# Patient Record
Sex: MALE | Race: WHITE | NOT HISPANIC OR LATINO | Employment: FULL TIME | URBAN - METROPOLITAN AREA
[De-identification: names, ages, dates, MRNs, and addresses within clinical notes are randomized per-mention and may not be internally consistent; named-entity substitution may affect disease eponyms.]

---

## 2024-09-02 ENCOUNTER — HOSPITAL ENCOUNTER (OUTPATIENT)
Facility: HOSPITAL | Age: 29
Setting detail: OBSERVATION
Discharge: HOME | End: 2024-09-06
Attending: EMERGENCY MEDICINE | Admitting: HOSPITALIST
Payer: MEDICAID

## 2024-09-02 ENCOUNTER — APPOINTMENT (OUTPATIENT)
Dept: CT IMAGING | Facility: HOSPITAL | Age: 29
End: 2024-09-02
Payer: MEDICAID

## 2024-09-02 ENCOUNTER — APPOINTMENT (OUTPATIENT)
Dept: GENERAL RADIOLOGY | Facility: HOSPITAL | Age: 29
End: 2024-09-02
Payer: MEDICAID

## 2024-09-02 DIAGNOSIS — R10.13 EPIGASTRIC PAIN: ICD-10-CM

## 2024-09-02 DIAGNOSIS — I10 HYPERTENSION, UNSPECIFIED TYPE: ICD-10-CM

## 2024-09-02 DIAGNOSIS — D72.829 LEUKOCYTOSIS, UNSPECIFIED TYPE: ICD-10-CM

## 2024-09-02 DIAGNOSIS — R11.2 NAUSEA AND VOMITING, UNSPECIFIED VOMITING TYPE: ICD-10-CM

## 2024-09-02 DIAGNOSIS — R07.9 CHEST PAIN, UNSPECIFIED TYPE: ICD-10-CM

## 2024-09-02 DIAGNOSIS — R93.5 ABNORMAL COMPUTED TOMOGRAPHY ANGIOGRAPHY (CTA) OF ABDOMEN AND PELVIS: Primary | ICD-10-CM

## 2024-09-02 LAB
ALBUMIN SERPL-MCNC: 4.1 G/DL (ref 3.5–5.2)
ALBUMIN/GLOB SERPL: 1 G/DL
ALP SERPL-CCNC: 121 U/L (ref 39–117)
ALT SERPL W P-5'-P-CCNC: 14 U/L (ref 1–41)
ANION GAP SERPL CALCULATED.3IONS-SCNC: 14 MMOL/L (ref 5–15)
AST SERPL-CCNC: 14 U/L (ref 1–40)
BASOPHILS # BLD AUTO: 0.03 10*3/MM3 (ref 0–0.2)
BASOPHILS NFR BLD AUTO: 0.2 % (ref 0–1.5)
BILIRUB SERPL-MCNC: 0.3 MG/DL (ref 0–1.2)
BUN SERPL-MCNC: 18 MG/DL (ref 6–20)
BUN/CREAT SERPL: 20.2 (ref 7–25)
CALCIUM SPEC-SCNC: 9.5 MG/DL (ref 8.6–10.5)
CHLORIDE SERPL-SCNC: 97 MMOL/L (ref 98–107)
CO2 SERPL-SCNC: 24 MMOL/L (ref 22–29)
CREAT SERPL-MCNC: 0.89 MG/DL (ref 0.76–1.27)
DEPRECATED RDW RBC AUTO: 37.5 FL (ref 37–54)
EGFRCR SERPLBLD CKD-EPI 2021: 120.5 ML/MIN/1.73
EOSINOPHIL # BLD AUTO: 0 10*3/MM3 (ref 0–0.4)
EOSINOPHIL NFR BLD AUTO: 0 % (ref 0.3–6.2)
ERYTHROCYTE [DISTWIDTH] IN BLOOD BY AUTOMATED COUNT: 12.8 % (ref 12.3–15.4)
GLOBULIN UR ELPH-MCNC: 4.2 GM/DL
GLUCOSE SERPL-MCNC: 86 MG/DL (ref 65–99)
HCT VFR BLD AUTO: 41.8 % (ref 37.5–51)
HGB BLD-MCNC: 13.9 G/DL (ref 13–17.7)
HOLD SPECIMEN: NORMAL
HOLD SPECIMEN: NORMAL
IMM GRANULOCYTES # BLD AUTO: 0.05 10*3/MM3 (ref 0–0.05)
IMM GRANULOCYTES NFR BLD AUTO: 0.4 % (ref 0–0.5)
LIPASE SERPL-CCNC: 10 U/L (ref 13–60)
LYMPHOCYTES # BLD AUTO: 1.79 10*3/MM3 (ref 0.7–3.1)
LYMPHOCYTES NFR BLD AUTO: 12.7 % (ref 19.6–45.3)
MCH RBC QN AUTO: 27.1 PG (ref 26.6–33)
MCHC RBC AUTO-ENTMCNC: 33.3 G/DL (ref 31.5–35.7)
MCV RBC AUTO: 81.5 FL (ref 79–97)
MONOCYTES # BLD AUTO: 0.96 10*3/MM3 (ref 0.1–0.9)
MONOCYTES NFR BLD AUTO: 6.8 % (ref 5–12)
NEUTROPHILS NFR BLD AUTO: 11.22 10*3/MM3 (ref 1.7–7)
NEUTROPHILS NFR BLD AUTO: 79.9 % (ref 42.7–76)
NRBC BLD AUTO-RTO: 0 /100 WBC (ref 0–0.2)
PLATELET # BLD AUTO: 227 10*3/MM3 (ref 140–450)
PMV BLD AUTO: 11.3 FL (ref 6–12)
POTASSIUM SERPL-SCNC: 4 MMOL/L (ref 3.5–5.2)
PROT SERPL-MCNC: 8.3 G/DL (ref 6–8.5)
RBC # BLD AUTO: 5.13 10*6/MM3 (ref 4.14–5.8)
SODIUM SERPL-SCNC: 135 MMOL/L (ref 136–145)
TROPONIN T SERPL HS-MCNC: <6 NG/L
WBC NRBC COR # BLD AUTO: 14.05 10*3/MM3 (ref 3.4–10.8)
WHOLE BLOOD HOLD COAG: NORMAL
WHOLE BLOOD HOLD SPECIMEN: NORMAL

## 2024-09-02 PROCEDURE — 93005 ELECTROCARDIOGRAM TRACING: CPT | Performed by: EMERGENCY MEDICINE

## 2024-09-02 PROCEDURE — 25810000003 SODIUM CHLORIDE 0.9 % SOLUTION: Performed by: EMERGENCY MEDICINE

## 2024-09-02 PROCEDURE — 71275 CT ANGIOGRAPHY CHEST: CPT

## 2024-09-02 PROCEDURE — 25010000002 ONDANSETRON PER 1 MG: Performed by: EMERGENCY MEDICINE

## 2024-09-02 PROCEDURE — 80053 COMPREHEN METABOLIC PANEL: CPT | Performed by: EMERGENCY MEDICINE

## 2024-09-02 PROCEDURE — 84484 ASSAY OF TROPONIN QUANT: CPT | Performed by: EMERGENCY MEDICINE

## 2024-09-02 PROCEDURE — 85025 COMPLETE CBC W/AUTO DIFF WBC: CPT | Performed by: EMERGENCY MEDICINE

## 2024-09-02 PROCEDURE — 83690 ASSAY OF LIPASE: CPT | Performed by: EMERGENCY MEDICINE

## 2024-09-02 PROCEDURE — 71045 X-RAY EXAM CHEST 1 VIEW: CPT

## 2024-09-02 PROCEDURE — 96361 HYDRATE IV INFUSION ADD-ON: CPT

## 2024-09-02 PROCEDURE — 96374 THER/PROPH/DIAG INJ IV PUSH: CPT

## 2024-09-02 PROCEDURE — 25510000001 IOPAMIDOL PER 1 ML: Performed by: EMERGENCY MEDICINE

## 2024-09-02 PROCEDURE — 74177 CT ABD & PELVIS W/CONTRAST: CPT

## 2024-09-02 PROCEDURE — 99285 EMERGENCY DEPT VISIT HI MDM: CPT

## 2024-09-02 PROCEDURE — 93010 ELECTROCARDIOGRAM REPORT: CPT | Performed by: INTERNAL MEDICINE

## 2024-09-02 PROCEDURE — 93005 ELECTROCARDIOGRAM TRACING: CPT

## 2024-09-02 PROCEDURE — G0378 HOSPITAL OBSERVATION PER HR: HCPCS

## 2024-09-02 RX ORDER — SODIUM CHLORIDE 0.9 % (FLUSH) 0.9 %
10 SYRINGE (ML) INJECTION AS NEEDED
Status: DISCONTINUED | OUTPATIENT
Start: 2024-09-02 | End: 2024-09-06 | Stop reason: HOSPADM

## 2024-09-02 RX ORDER — IOPAMIDOL 755 MG/ML
100 INJECTION, SOLUTION INTRAVASCULAR
Status: COMPLETED | OUTPATIENT
Start: 2024-09-02 | End: 2024-09-02

## 2024-09-02 RX ORDER — MORPHINE SULFATE 2 MG/ML
2 INJECTION, SOLUTION INTRAMUSCULAR; INTRAVENOUS ONCE AS NEEDED
Status: COMPLETED | OUTPATIENT
Start: 2024-09-02 | End: 2024-09-03

## 2024-09-02 RX ORDER — ONDANSETRON 2 MG/ML
8 INJECTION INTRAMUSCULAR; INTRAVENOUS ONCE
Status: COMPLETED | OUTPATIENT
Start: 2024-09-02 | End: 2024-09-02

## 2024-09-02 RX ADMIN — SODIUM CHLORIDE 1000 ML: 9 INJECTION, SOLUTION INTRAVENOUS at 23:09

## 2024-09-02 RX ADMIN — IOPAMIDOL 95 ML: 755 INJECTION, SOLUTION INTRAVENOUS at 22:44

## 2024-09-02 RX ADMIN — ONDANSETRON 8 MG: 2 INJECTION, SOLUTION INTRAMUSCULAR; INTRAVENOUS at 23:09

## 2024-09-03 ENCOUNTER — APPOINTMENT (OUTPATIENT)
Dept: CARDIOLOGY | Facility: HOSPITAL | Age: 29
End: 2024-09-03
Payer: MEDICAID

## 2024-09-03 ENCOUNTER — APPOINTMENT (OUTPATIENT)
Dept: CT IMAGING | Facility: HOSPITAL | Age: 29
End: 2024-09-03
Payer: MEDICAID

## 2024-09-03 PROBLEM — R11.2 NAUSEA & VOMITING: Status: ACTIVE | Noted: 2024-09-03

## 2024-09-03 PROBLEM — R06.00 DYSPNEA: Status: ACTIVE | Noted: 2024-09-03

## 2024-09-03 PROBLEM — R07.9 CHEST PAIN: Status: ACTIVE | Noted: 2024-09-03

## 2024-09-03 PROBLEM — D64.9 ANEMIA: Status: ACTIVE | Noted: 2024-09-03

## 2024-09-03 PROBLEM — R93.5 ABNORMAL COMPUTED TOMOGRAPHY ANGIOGRAPHY (CTA) OF ABDOMEN AND PELVIS: Status: ACTIVE | Noted: 2024-09-03

## 2024-09-03 PROBLEM — R94.31 ABNORMAL EKG: Status: ACTIVE | Noted: 2024-09-03

## 2024-09-03 PROBLEM — D72.829 LEUKOCYTOSIS: Status: ACTIVE | Noted: 2024-09-03

## 2024-09-03 LAB
ANION GAP SERPL CALCULATED.3IONS-SCNC: 11 MMOL/L (ref 5–15)
AORTIC ARCH: 1.8 CM
AORTIC DIMENSIONLESS INDEX: 0.9 (DI)
ASCENDING AORTA: 2.4 CM
BH CV ECHO MEAS - ACS: 2.29 CM
BH CV ECHO MEAS - AO MAX PG: 4.6 MMHG
BH CV ECHO MEAS - AO MEAN PG: 3 MMHG
BH CV ECHO MEAS - AO ROOT DIAM: 2.9 CM
BH CV ECHO MEAS - AO V2 MAX: 107 CM/SEC
BH CV ECHO MEAS - AO V2 VTI: 20.5 CM
BH CV ECHO MEAS - AVA(I,D): 2.9 CM2
BH CV ECHO MEAS - EDV(CUBED): 81.4 ML
BH CV ECHO MEAS - EDV(MOD-SP2): 92 ML
BH CV ECHO MEAS - EDV(MOD-SP4): 97 ML
BH CV ECHO MEAS - EF(MOD-BP): 65.4 %
BH CV ECHO MEAS - EF(MOD-SP2): 65.2 %
BH CV ECHO MEAS - EF(MOD-SP4): 66 %
BH CV ECHO MEAS - ESV(CUBED): 24 ML
BH CV ECHO MEAS - ESV(MOD-SP2): 32 ML
BH CV ECHO MEAS - ESV(MOD-SP4): 33 ML
BH CV ECHO MEAS - FS: 33.5 %
BH CV ECHO MEAS - IVS/LVPW: 1.15 CM
BH CV ECHO MEAS - IVSD: 0.82 CM
BH CV ECHO MEAS - LAT PEAK E' VEL: 15.1 CM/SEC
BH CV ECHO MEAS - LV MASS(C)D: 100.6 GRAMS
BH CV ECHO MEAS - LV MAX PG: 3.5 MMHG
BH CV ECHO MEAS - LV MEAN PG: 2 MMHG
BH CV ECHO MEAS - LV V1 MAX: 93.1 CM/SEC
BH CV ECHO MEAS - LV V1 VTI: 17.9 CM
BH CV ECHO MEAS - LVIDD: 4.3 CM
BH CV ECHO MEAS - LVIDS: 2.9 CM
BH CV ECHO MEAS - LVOT AREA: 3.3 CM2
BH CV ECHO MEAS - LVOT DIAM: 2.06 CM
BH CV ECHO MEAS - LVPWD: 0.71 CM
BH CV ECHO MEAS - MED PEAK E' VEL: 12.5 CM/SEC
BH CV ECHO MEAS - MV A DUR: 0.1 SEC
BH CV ECHO MEAS - MV A MAX VEL: 46.1 CM/SEC
BH CV ECHO MEAS - MV DEC SLOPE: 427.6 CM/SEC2
BH CV ECHO MEAS - MV DEC TIME: 0.22 SEC
BH CV ECHO MEAS - MV E MAX VEL: 77.6 CM/SEC
BH CV ECHO MEAS - MV E/A: 1.68
BH CV ECHO MEAS - MV MAX PG: 3.5 MMHG
BH CV ECHO MEAS - MV MEAN PG: 1.1 MMHG
BH CV ECHO MEAS - MV P1/2T: 63.2 MSEC
BH CV ECHO MEAS - MV V2 VTI: 29.8 CM
BH CV ECHO MEAS - MVA(P1/2T): 3.5 CM2
BH CV ECHO MEAS - MVA(VTI): 2 CM2
BH CV ECHO MEAS - PA ACC TIME: 0.12 SEC
BH CV ECHO MEAS - PA V2 MAX: 122.2 CM/SEC
BH CV ECHO MEAS - PULM A REVS DUR: 0.11 SEC
BH CV ECHO MEAS - PULM A REVS VEL: 17.9 CM/SEC
BH CV ECHO MEAS - PULM DIAS VEL: 62.1 CM/SEC
BH CV ECHO MEAS - PULM S/D: 0.52
BH CV ECHO MEAS - PULM SYS VEL: 32 CM/SEC
BH CV ECHO MEAS - RAP SYSTOLE: 3 MMHG
BH CV ECHO MEAS - RV MAX PG: 5.7 MMHG
BH CV ECHO MEAS - RV V1 MAX: 119.8 CM/SEC
BH CV ECHO MEAS - RV V1 VTI: 21.8 CM
BH CV ECHO MEAS - RVSP: 12 MMHG
BH CV ECHO MEAS - SUP REN AO DIAM: 1.5 CM
BH CV ECHO MEAS - SV(LVOT): 59.6 ML
BH CV ECHO MEAS - SV(MOD-SP2): 60 ML
BH CV ECHO MEAS - SV(MOD-SP4): 64 ML
BH CV ECHO MEAS - TAPSE (>1.6): 2.7 CM
BH CV ECHO MEAS - TR MAX PG: 8.6 MMHG
BH CV ECHO MEAS - TR MAX VEL: 146.6 CM/SEC
BH CV ECHO MEASUREMENTS AVERAGE E/E' RATIO: 5.62
BH CV XLRA - RV BASE: 3.1 CM
BH CV XLRA - RV LENGTH: 8.2 CM
BH CV XLRA - RV MID: 3 CM
BH CV XLRA - TDI S': 18.3 CM/SEC
BILIRUB UR QL STRIP: NEGATIVE
BUN SERPL-MCNC: 16 MG/DL (ref 6–20)
BUN/CREAT SERPL: 21.1 (ref 7–25)
CALCIUM SPEC-SCNC: 8.8 MG/DL (ref 8.6–10.5)
CHLORIDE SERPL-SCNC: 101 MMOL/L (ref 98–107)
CLARITY UR: CLEAR
CO2 SERPL-SCNC: 23 MMOL/L (ref 22–29)
COLOR UR: YELLOW
CREAT SERPL-MCNC: 0.76 MG/DL (ref 0.76–1.27)
DEPRECATED RDW RBC AUTO: 34.7 FL (ref 37–54)
EGFRCR SERPLBLD CKD-EPI 2021: 126.3 ML/MIN/1.73
ERYTHROCYTE [DISTWIDTH] IN BLOOD BY AUTOMATED COUNT: 12.3 % (ref 12.3–15.4)
GEN 5 2HR TROPONIN T REFLEX: 6 NG/L
GLUCOSE SERPL-MCNC: 79 MG/DL (ref 65–99)
GLUCOSE UR STRIP-MCNC: NEGATIVE MG/DL
HCT VFR BLD AUTO: 36.7 % (ref 37.5–51)
HGB BLD-MCNC: 12 G/DL (ref 13–17.7)
HGB UR QL STRIP.AUTO: NEGATIVE
KETONES UR QL STRIP: ABNORMAL
LEFT ATRIUM VOLUME INDEX: 14.1 ML/M2
LEUKOCYTE ESTERASE UR QL STRIP.AUTO: NEGATIVE
MCH RBC QN AUTO: 26.3 PG (ref 26.6–33)
MCHC RBC AUTO-ENTMCNC: 32.7 G/DL (ref 31.5–35.7)
MCV RBC AUTO: 80.3 FL (ref 79–97)
NITRITE UR QL STRIP: NEGATIVE
PH UR STRIP.AUTO: 6 [PH] (ref 5–8)
PLATELET # BLD AUTO: 245 10*3/MM3 (ref 140–450)
PMV BLD AUTO: 11.5 FL (ref 6–12)
POTASSIUM SERPL-SCNC: 4.3 MMOL/L (ref 3.5–5.2)
PROT UR QL STRIP: NEGATIVE
QT INTERVAL: 385 MS
QTC INTERVAL: 442 MS
RBC # BLD AUTO: 4.57 10*6/MM3 (ref 4.14–5.8)
SINUS: 2.35 CM
SODIUM SERPL-SCNC: 135 MMOL/L (ref 136–145)
SP GR UR STRIP: >1.03 (ref 1–1.03)
STJ: 2.07 CM
TROPONIN T DELTA: NORMAL
TROPONIN T SERPL HS-MCNC: 7 NG/L
UROBILINOGEN UR QL STRIP: ABNORMAL
WBC NRBC COR # BLD AUTO: 13.39 10*3/MM3 (ref 3.4–10.8)

## 2024-09-03 PROCEDURE — 36415 COLL VENOUS BLD VENIPUNCTURE: CPT | Performed by: NURSE PRACTITIONER

## 2024-09-03 PROCEDURE — G0378 HOSPITAL OBSERVATION PER HR: HCPCS

## 2024-09-03 PROCEDURE — 84484 ASSAY OF TROPONIN QUANT: CPT | Performed by: NURSE PRACTITIONER

## 2024-09-03 PROCEDURE — 93306 TTE W/DOPPLER COMPLETE: CPT

## 2024-09-03 PROCEDURE — 99204 OFFICE O/P NEW MOD 45 MIN: CPT | Performed by: SURGERY

## 2024-09-03 PROCEDURE — 81003 URINALYSIS AUTO W/O SCOPE: CPT | Performed by: EMERGENCY MEDICINE

## 2024-09-03 PROCEDURE — 99254 IP/OBS CNSLTJ NEW/EST MOD 60: CPT | Performed by: INTERNAL MEDICINE

## 2024-09-03 PROCEDURE — 96361 HYDRATE IV INFUSION ADD-ON: CPT

## 2024-09-03 PROCEDURE — 25510000001 PERFLUTREN 6.52 MG/ML SUSPENSION 2 ML VIAL: Performed by: INTERNAL MEDICINE

## 2024-09-03 PROCEDURE — 25010000002 MORPHINE PER 10 MG: Performed by: EMERGENCY MEDICINE

## 2024-09-03 PROCEDURE — 25810000003 SODIUM CHLORIDE 0.9 % SOLUTION: Performed by: NURSE PRACTITIONER

## 2024-09-03 PROCEDURE — 85027 COMPLETE CBC AUTOMATED: CPT | Performed by: NURSE PRACTITIONER

## 2024-09-03 PROCEDURE — 74175 CTA ABDOMEN W/CONTRAST: CPT

## 2024-09-03 PROCEDURE — 25010000002 MORPHINE PER 10 MG: Performed by: NURSE PRACTITIONER

## 2024-09-03 PROCEDURE — 96375 TX/PRO/DX INJ NEW DRUG ADDON: CPT

## 2024-09-03 PROCEDURE — 80048 BASIC METABOLIC PNL TOTAL CA: CPT | Performed by: NURSE PRACTITIONER

## 2024-09-03 PROCEDURE — 25510000001 IOPAMIDOL PER 1 ML: Performed by: HOSPITALIST

## 2024-09-03 PROCEDURE — 93306 TTE W/DOPPLER COMPLETE: CPT | Performed by: INTERNAL MEDICINE

## 2024-09-03 PROCEDURE — 96376 TX/PRO/DX INJ SAME DRUG ADON: CPT

## 2024-09-03 PROCEDURE — 84484 ASSAY OF TROPONIN QUANT: CPT | Performed by: EMERGENCY MEDICINE

## 2024-09-03 PROCEDURE — 36415 COLL VENOUS BLD VENIPUNCTURE: CPT

## 2024-09-03 RX ORDER — AMOXICILLIN 250 MG
2 CAPSULE ORAL 2 TIMES DAILY PRN
Status: DISCONTINUED | OUTPATIENT
Start: 2024-09-03 | End: 2024-09-06 | Stop reason: HOSPADM

## 2024-09-03 RX ORDER — CALCIUM CARBONATE 500 MG/1
2 TABLET, CHEWABLE ORAL 2 TIMES DAILY PRN
Status: DISCONTINUED | OUTPATIENT
Start: 2024-09-03 | End: 2024-09-06 | Stop reason: HOSPADM

## 2024-09-03 RX ORDER — POLYETHYLENE GLYCOL 3350 17 G/17G
17 POWDER, FOR SOLUTION ORAL DAILY PRN
Status: DISCONTINUED | OUTPATIENT
Start: 2024-09-03 | End: 2024-09-06 | Stop reason: HOSPADM

## 2024-09-03 RX ORDER — ACETAMINOPHEN 160 MG/5ML
650 SOLUTION ORAL EVERY 4 HOURS PRN
Status: DISCONTINUED | OUTPATIENT
Start: 2024-09-03 | End: 2024-09-06 | Stop reason: HOSPADM

## 2024-09-03 RX ORDER — SUCRALFATE 1 G/1
1 TABLET ORAL
Status: DISCONTINUED | OUTPATIENT
Start: 2024-09-03 | End: 2024-09-06 | Stop reason: HOSPADM

## 2024-09-03 RX ORDER — PANTOPRAZOLE SODIUM 40 MG/1
40 TABLET, DELAYED RELEASE ORAL
Status: DISCONTINUED | OUTPATIENT
Start: 2024-09-03 | End: 2024-09-06 | Stop reason: HOSPADM

## 2024-09-03 RX ORDER — SODIUM CHLORIDE 9 MG/ML
40 INJECTION, SOLUTION INTRAVENOUS AS NEEDED
Status: DISCONTINUED | OUTPATIENT
Start: 2024-09-03 | End: 2024-09-06 | Stop reason: HOSPADM

## 2024-09-03 RX ORDER — ACETAMINOPHEN 650 MG/1
650 SUPPOSITORY RECTAL EVERY 4 HOURS PRN
Status: DISCONTINUED | OUTPATIENT
Start: 2024-09-03 | End: 2024-09-06 | Stop reason: HOSPADM

## 2024-09-03 RX ORDER — ONDANSETRON 4 MG/1
4 TABLET, ORALLY DISINTEGRATING ORAL EVERY 6 HOURS PRN
Status: DISCONTINUED | OUTPATIENT
Start: 2024-09-03 | End: 2024-09-06 | Stop reason: HOSPADM

## 2024-09-03 RX ORDER — NITROGLYCERIN 0.4 MG/1
0.4 TABLET SUBLINGUAL
Status: DISCONTINUED | OUTPATIENT
Start: 2024-09-03 | End: 2024-09-06 | Stop reason: HOSPADM

## 2024-09-03 RX ORDER — SODIUM CHLORIDE 0.9 % (FLUSH) 0.9 %
10 SYRINGE (ML) INJECTION EVERY 12 HOURS SCHEDULED
Status: DISCONTINUED | OUTPATIENT
Start: 2024-09-03 | End: 2024-09-06 | Stop reason: HOSPADM

## 2024-09-03 RX ORDER — SODIUM CHLORIDE 0.9 % (FLUSH) 0.9 %
10 SYRINGE (ML) INJECTION AS NEEDED
Status: DISCONTINUED | OUTPATIENT
Start: 2024-09-03 | End: 2024-09-06 | Stop reason: HOSPADM

## 2024-09-03 RX ORDER — IOPAMIDOL 755 MG/ML
100 INJECTION, SOLUTION INTRAVASCULAR
Status: COMPLETED | OUTPATIENT
Start: 2024-09-03 | End: 2024-09-03

## 2024-09-03 RX ORDER — BISACODYL 10 MG
10 SUPPOSITORY, RECTAL RECTAL DAILY PRN
Status: DISCONTINUED | OUTPATIENT
Start: 2024-09-03 | End: 2024-09-06 | Stop reason: HOSPADM

## 2024-09-03 RX ORDER — MORPHINE SULFATE 2 MG/ML
2 INJECTION, SOLUTION INTRAMUSCULAR; INTRAVENOUS
Status: DISCONTINUED | OUTPATIENT
Start: 2024-09-03 | End: 2024-09-04

## 2024-09-03 RX ORDER — BISACODYL 5 MG/1
5 TABLET, DELAYED RELEASE ORAL DAILY PRN
Status: DISCONTINUED | OUTPATIENT
Start: 2024-09-03 | End: 2024-09-06 | Stop reason: HOSPADM

## 2024-09-03 RX ORDER — SODIUM CHLORIDE 9 MG/ML
75 INJECTION, SOLUTION INTRAVENOUS CONTINUOUS
Status: DISCONTINUED | OUTPATIENT
Start: 2024-09-03 | End: 2024-09-04

## 2024-09-03 RX ORDER — ONDANSETRON 2 MG/ML
4 INJECTION INTRAMUSCULAR; INTRAVENOUS EVERY 6 HOURS PRN
Status: DISCONTINUED | OUTPATIENT
Start: 2024-09-03 | End: 2024-09-06 | Stop reason: HOSPADM

## 2024-09-03 RX ORDER — ACETAMINOPHEN 325 MG/1
650 TABLET ORAL EVERY 4 HOURS PRN
Status: DISCONTINUED | OUTPATIENT
Start: 2024-09-03 | End: 2024-09-06 | Stop reason: HOSPADM

## 2024-09-03 RX ADMIN — MORPHINE SULFATE 2 MG: 2 INJECTION, SOLUTION INTRAMUSCULAR; INTRAVENOUS at 20:38

## 2024-09-03 RX ADMIN — MORPHINE SULFATE 2 MG: 2 INJECTION, SOLUTION INTRAMUSCULAR; INTRAVENOUS at 23:25

## 2024-09-03 RX ADMIN — SODIUM CHLORIDE 75 ML/HR: 9 INJECTION, SOLUTION INTRAVENOUS at 02:27

## 2024-09-03 RX ADMIN — SUCRALFATE 1 G: 1 TABLET ORAL at 20:38

## 2024-09-03 RX ADMIN — MORPHINE SULFATE 2 MG: 2 INJECTION, SOLUTION INTRAMUSCULAR; INTRAVENOUS at 00:38

## 2024-09-03 RX ADMIN — MORPHINE SULFATE 2 MG: 2 INJECTION, SOLUTION INTRAMUSCULAR; INTRAVENOUS at 15:43

## 2024-09-03 RX ADMIN — MORPHINE SULFATE 2 MG: 2 INJECTION, SOLUTION INTRAMUSCULAR; INTRAVENOUS at 03:00

## 2024-09-03 RX ADMIN — MORPHINE SULFATE 2 MG: 2 INJECTION, SOLUTION INTRAMUSCULAR; INTRAVENOUS at 06:03

## 2024-09-03 RX ADMIN — MORPHINE SULFATE 2 MG: 2 INJECTION, SOLUTION INTRAMUSCULAR; INTRAVENOUS at 09:13

## 2024-09-03 RX ADMIN — MORPHINE SULFATE 2 MG: 2 INJECTION, SOLUTION INTRAMUSCULAR; INTRAVENOUS at 12:29

## 2024-09-03 RX ADMIN — IOPAMIDOL 95 ML: 755 INJECTION, SOLUTION INTRAVENOUS at 14:53

## 2024-09-03 RX ADMIN — Medication 10 ML: at 03:01

## 2024-09-03 RX ADMIN — SUCRALFATE 1 G: 1 TABLET ORAL at 18:16

## 2024-09-03 RX ADMIN — Medication 10 ML: at 20:38

## 2024-09-03 RX ADMIN — Medication 10 ML: at 08:23

## 2024-09-03 RX ADMIN — PERFLUTREN 3.5 ML: 6.52 INJECTION, SUSPENSION INTRAVENOUS at 15:00

## 2024-09-03 RX ADMIN — PANTOPRAZOLE SODIUM 40 MG: 40 TABLET, DELAYED RELEASE ORAL at 10:08

## 2024-09-03 RX ADMIN — SODIUM CHLORIDE 75 ML/HR: 9 INJECTION, SOLUTION INTRAVENOUS at 15:43

## 2024-09-03 RX ADMIN — SUCRALFATE 1 G: 1 TABLET ORAL at 10:08

## 2024-09-03 RX ADMIN — PANTOPRAZOLE SODIUM 40 MG: 40 TABLET, DELAYED RELEASE ORAL at 18:16

## 2024-09-03 NOTE — ED NOTES
Nursing report ED to floor  Anders Figueredo  28 y.o.  male    HPI :  HPI (Adult)  Stated Reason for Visit: Chest pain, abdominal pain x 2 days  History Obtained From: patient  Associated Signs/Symptoms: abdominal pain, chest pain    Chief Complaint  Chief Complaint   Patient presents with    Chest Pain    Abdominal Pain       Admitting doctor:   Ryan Mitchell MD    Admitting diagnosis:   The primary encounter diagnosis was Abnormal computed tomography angiography (CTA) of abdomen and pelvis. Diagnoses of Chest pain, unspecified type, Epigastric pain, Nausea and vomiting, unspecified vomiting type, Leukocytosis, unspecified type, and Hypertension, unspecified type were also pertinent to this visit.    Code status:   Current Code Status       Date Active Code Status Order ID Comments User Context       Not on file            Allergies:   Patient has no known allergies.    Isolation:   No active isolations    Intake and Output    Intake/Output Summary (Last 24 hours) at 9/3/2024 0135  Last data filed at 9/3/2024 0038  Gross per 24 hour   Intake 1000 ml   Output --   Net 1000 ml       Weight:       09/02/24 2123   Weight: 68 kg (150 lb)       Most recent vitals:   Vitals:    09/02/24 2301 09/02/24 2331 09/03/24 0001 09/03/24 0101   BP: 126/77 139/93 (!) 146/101 140/90   BP Location:       Patient Position:       Pulse: 73 72 81 70   Resp:       Temp:       TempSrc:       SpO2: 98% 100% 98% 100%   Weight:       Height:           Active LDAs/IV Access:   Lines, Drains & Airways       Active LDAs       Name Placement date Placement time Site Days    Peripheral IV 09/02/24 2211 Anterior;Distal;Left;Upper Arm 09/02/24 2211  Arm  less than 1                    Labs (abnormal labs have a star):   Labs Reviewed   LIPASE - Abnormal; Notable for the following components:       Result Value    Lipase 10 (*)     All other components within normal limits   COMPREHENSIVE METABOLIC PANEL - Abnormal; Notable for the  following components:    Sodium 135 (*)     Chloride 97 (*)     Alkaline Phosphatase 121 (*)     All other components within normal limits    Narrative:     GFR Normal >60  Chronic Kidney Disease <60  Kidney Failure <15     CBC WITH AUTO DIFFERENTIAL - Abnormal; Notable for the following components:    WBC 14.05 (*)     Neutrophil % 79.9 (*)     Lymphocyte % 12.7 (*)     Eosinophil % 0.0 (*)     Neutrophils, Absolute 11.22 (*)     Monocytes, Absolute 0.96 (*)     All other components within normal limits   TROPONIN - Normal    Narrative:     High Sensitive Troponin T Reference Range:  <14.0 ng/L- Negative Female for AMI  <22.0 ng/L- Negative Male for AMI  >=14 - Abnormal Female indicating possible myocardial injury.  >=22 - Abnormal Male indicating possible myocardial injury.   Clinicians would have to utilize clinical acumen, EKG, Troponin, and serial changes to determine if it is an Acute Myocardial Infarction or myocardial injury due to an underlying chronic condition.        RAINBOW DRAW    Narrative:     The following orders were created for panel order Terrell Draw.  Procedure                               Abnormality         Status                     ---------                               -----------         ------                     Green Top (Gel)[[id number]]                                  Final result               Lavender Top[[id number]]                                     Final result               Gold Top - SST[[id number]]                                   Final result               Light Blue Top[[id number]]                                   Final result                 Please view results for these tests on the individual orders.   HIGH SENSITIVITIY TROPONIN T 2HR    Narrative:     High Sensitive Troponin T Reference Range:  <14.0 ng/L- Negative Female for AMI  <22.0 ng/L- Negative Male for AMI  >=14 - Abnormal Female indicating possible myocardial injury.  >=22 - Abnormal Male indicating possible  myocardial injury.   Clinicians would have to utilize clinical acumen, EKG, Troponin, and serial changes to determine if it is an Acute Myocardial Infarction or myocardial injury due to an underlying chronic condition.        URINALYSIS W/ MICROSCOPIC IF INDICATED (NO CULTURE)   CBC AND DIFFERENTIAL    Narrative:     The following orders were created for panel order CBC & Differential.  Procedure                               Abnormality         Status                     ---------                               -----------         ------                     CBC Auto Differential[[id number]]        Abnormal            Final result                 Please view results for these tests on the individual orders.   GREEN TOP   LAVENDER TOP   GOLD TOP - SST   LIGHT BLUE TOP       EKG:   ECG 12 Lead Chest Pain   Preliminary Result   HEART RATE=79  bpm   RR Yhqfkstk=163  ms   OR Otzaqrbb=865  ms   P Horizontal Axis=16  deg   P Front Axis=81  deg   QRSD Xflhskds=447  ms   QT Nrggqctg=268  ms   NHiR=332  ms   QRS Axis=96  deg   T Wave Axis=33  deg   - ABNORMAL ECG -   Sinus rhythm   Atrial premature complexes   Borderline short OR interval   RBBB and LPFB   Baseline wander in lead(s) V5   Date and Time of Study:2024-09-02 21:35:55      Telemetry Scan   Final Result      Telemetry Scan   Final Result          Meds given in ED:   Medications   sodium chloride 0.9 % flush 10 mL (has no administration in time range)   morphine injection 2 mg (2 mg Intravenous Given 9/3/24 0038)   ondansetron (ZOFRAN) injection 8 mg (8 mg Intravenous Given 9/2/24 2309)   sodium chloride 0.9 % bolus 1,000 mL (0 mL Intravenous Stopped 9/3/24 0038)   iopamidol (ISOVUE-370) 76 % injection 100 mL (95 mL Intravenous Given 9/2/24 2244)       Imaging results:  CT Angiogram Chest Pulmonary Embolism    Result Date: 9/2/2024   1. No acute pulmonary thromboembolus is seen. 2. The patient has an irregular appearance to the proximal celiac axis, which raises the  possibility of dissection. The celiac axis does remain patent. The left gastric artery, splenic artery, and common hepatic artery are all patent. There is a small outpouching which is seen arising from the proximal celiac axis. 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 Vascular surgery consultation is recommended.  Radiation dose reduction techniques were utilized, including automated exposure control and exposure modulation based on body size.   This report was finalized on 9/2/2024 11:17 PM by Dr. Enriqueta Vivas M.D on Workstation: [id number]      CT Abdomen Pelvis With Contrast    Result Date: 9/2/2024   1. No acute pulmonary thromboembolus is seen. 2. The patient has an irregular appearance to the proximal celiac axis, which raises the possibility of dissection. The celiac axis does remain patent. The left gastric artery, splenic artery, and common hepatic artery are all patent. There is a small outpouching which is seen arising from the proximal celiac axis. 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 Vascular surgery consultation is recommended.  Radiation dose reduction techniques were utilized, including automated exposure control and exposure modulation based on body size.   This report was finalized on 9/2/2024 11:17 PM by Dr. Enriqueta Vivas M.D on Workstation: [employer]      XR Chest 1 View    Result Date: 9/2/2024  No acute findings.  This report was finalized on 9/2/2024 10:38 PM by Dr. Enriqueta Vivas M.D on Workstation: [email or web address]       Ambulatory status:   - up as tolerated     Social issues:   Social History     Socioeconomic History    Marital status: Single       Peripheral Neurovascular  Peripheral Neurovascular (Adult)  Peripheral Neurovascular WDL: WDL    Neuro Cognitive  Neuro Cognitive (Adult)  Cognitive/Neuro/Behavioral WDL: WDL    Learning       Respiratory  Respiratory WDL  Respiratory WDL: .WDL except,  rhythm/pattern  Rhythm/Pattern, Respiratory: shortness of breath    Abdominal Pain       Pain Assessments  Pain (Adult)  (0-10) Pain Rating: Rest: 8  (0-10) Pain Rating: Activity: 8  Pain Location: chest  Pain Description: constant, pressure, sharp    NIH Stroke Scale       Malcolm Melvin RN  09/03/24 01:35 EDT

## 2024-09-03 NOTE — PLAN OF CARE
Problem: Adult Inpatient Plan of Care  Goal: Plan of Care Review  Outcome: Ongoing, Progressing  Flowsheets (Taken 9/3/2024 0421)  Progress: no change  Plan of Care Reviewed With: patient  Outcome Evaluation: Pt admitted to  with abnormal CTA of abd and chest pain. Vital signs stable. Pt up independently. PRN morphine given for pain. Pt able to rest after pain medicine given. Plan of care ongoing.

## 2024-09-03 NOTE — CONSULTS
Patient Name: Anders Figueredo Account #: [id number]    MRN: [id number] Admission Date: 9/2/2024      Consulting Service: Vascular Surgery Date of Evaluation: September 3, 2024    Requesting Provider: Blayne Meyers MD    CHIEF COMPLAINT: Celiac artery abnormality  HPI: Anders Figueredo is a 28 y.o. male is being seen for a consultation and evaluation/management of abnormality of the celiac artery on CT that could be anything from dissection with pseudoaneurysm to vasculitis to false positive reading.  Imaging is not good enough for me to really define any type of dissection or any real inflammation in the area.  I believe that a repeat CT will be needed for us to get any real information.  I do not believe he tolerated duplex at this time given his tenderness and his epigastric area.    PAST MEDICAL HISTORY: History reviewed. No pertinent past medical history.   PAST SURGICAL HISTORY: History reviewed. No pertinent surgical history.   FAMILY HISTORY: History reviewed. No pertinent family history.   SOCIAL HISTORY:   Social History     Tobacco Use    Smoking status: Never    Smokeless tobacco: Never   Vaping Use    Vaping status: Never Used   Substance Use Topics    Drug use: Yes     Types: Marijuana     Comment: Pt reports smoking cannibis      MEDICATIONS:   No current facility-administered medications on file prior to encounter.     No current outpatient medications on file prior to encounter.             ALLERGIES: Patient has no known allergies.   COMPLETE REVIEW OF SYSTEMS:     ENT ROS: negative  Cardiovascular ROS: no chest pain or dyspnea on exertion  Respiratory ROS: no cough, shortness of breath, or wheezing  Gastrointestinal ROS: Midepigastric pain  Neurological ROS: no TIA or stroke symptoms  Genito-Urinary ROS: no dysuria, trouble voiding, or hematuria  Musculoskeletal ROS: negative  Dermatological ROS: negative  Psychological ROS: negative      PHYSICAL EXAM:   Patient Vitals for the past 24 hrs:    "BP Temp Temp src Pulse Resp SpO2 Height Weight   09/03/24 1319 132/91 98.4 °F (36.9 °C) Oral 71 16 96 % -- --   09/03/24 0725 141/90 98.1 °F (36.7 °C) Oral 72 16 98 % -- --   09/03/24 0220 143/92 98.1 °F (36.7 °C) Oral 65 16 97 % -- --   09/03/24 0101 140/90 -- -- 70 -- 100 % -- --   09/03/24 0001 (!) 146/101 -- -- 81 -- 98 % -- --   09/02/24 2331 139/93 -- -- 72 -- 100 % -- --   09/02/24 2301 126/77 -- -- 73 -- 98 % -- --   09/02/24 2231 (!) 134/102 -- -- 73 -- 97 % -- --   09/02/24 2128 124/97 -- -- -- -- -- -- --   09/02/24 2123 -- 98.5 °F (36.9 °C) Tympanic 112 16 95 % 180.3 cm (71\") 68 kg (150 lb)        General appearance: oriented to person, place, and time, in mild to moderate distress, and ill-appearing.  Neurological exam reveals alert, oriented, normal speech, no focal findings or movement disorder noted.  ENT exam reveals - ENT exam normal, no neck nodes or sinus tenderness.  CVS exam: normal rate, regular rhythm, normal S1, S2, no murmurs, rubs, clicks or gallops.  Chest: clear to auscultation, no wheezes, rales or rhonchi, symmetric air entry.  Abdominal exam: tenderness noted in the upper epigastric area.  No rebound or guarding..  Examination of the feet reveals warm, good capillary refill.        LABS:      Results Review:       I reviewed the patient's new clinical results.  Results from last 7 days   Lab Units 09/03/24  0420 09/02/24  2212   WBC 10*3/mm3 13.39* 14.05*   HEMOGLOBIN g/dL 12.0* 13.9   PLATELETS 10*3/mm3 245 227     Results from last 7 days   Lab Units 09/03/24  0420 09/02/24  2212   SODIUM mmol/L 135* 135*   POTASSIUM mmol/L 4.3 4.0   CHLORIDE mmol/L 101 97*   CO2 mmol/L 23.0 24.0   BUN mg/dL 16 18   CREATININE mg/dL 0.76 0.89   GLUCOSE mg/dL 79 86   Estimated Creatinine Clearance: 140.4 mL/min (by C-G formula based on SCr of 0.76 mg/dL).  Results from last 7 days   Lab Units 09/03/24  0420 09/02/24  2212   CALCIUM mg/dL 8.8 9.5   ALBUMIN g/dL  --  4.1           The following " radiologic or non-invasive studies have been reviewed by me: CT scan reviewed with images reviewed from the ER.  Really unable to see any evidence of dissection but am concerned about this possible saccular aneurysm.  Quality of the study is questionable.  Repeat is indicated    Active Hospital Problems    Diagnosis  POA    **Abnormal computed tomography angiography (CTA) of abdomen and pelvis [R93.5]  Yes    Anemia [D64.9]  No    Leukocytosis [D72.829]  Yes    Chest pain [R07.9]  Yes    Nausea & vomiting [R11.2]  Yes    Dyspnea [R06.00]  Yes    Abnormal EKG [R94.31]  Yes      Resolved Hospital Problems   No resolved problems to display.         ASSESSMENT/PLAN: 28 y.o. male with unusual findings in the celiac origin area on the CT scan.  These may correlate with the epigastric pain this young man is having.  Very difficult study to define as there does appear to be abnormality but I do not see anything that looks like a dissection and really no evidence of true vasculitis.  There is an area of pointing to the left side of the artery that looks more like the origin of the vessel but does seem to stop immediately after about 7 to 8 mm.  At this point in time I believe that our best choice is a repeat CTA focusing on this area in particular.  Will get three-dimensional reconstructions.  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  Long-term follow-up will be needed especially if there is an aneurysmal change.  Again he denies history of connective tissue disorder or dissection in his family.  He denies somatic complaints consistent with autoimmune disease.      I discussed the plan with the patient and he is agreeable to the plan of care at this point. Thank you for this consult.     Noel Mcdowell MD   09/03/24

## 2024-09-03 NOTE — ED PROVIDER NOTES
EMERGENCY DEPARTMENT ENCOUNTER  Room Number:  18/18  Date of encounter:  9/3/2024  PCP: Provider, No Known  Patient Care Team:  Provider, No Known as PCP - General     HPI:  Context: Anders Figueredo is a 28 y.o. male who presents to the ED c/o chief complaint of chest pain.  Patient reports that has been having constant chest pain in his sternum for the last 2 days, pain is described as both dull and bulging burning, radiates into his neck.  Patient does endorse associated shortness of breath.  Patient denies any radiation to his arms, patient not exertional.  Patient reports nausea vomiting, no diaphoresis.  Patient reports he is also having epigastric pain.  Patient reports 2 times emesis today, emesis nonbloody nonbilious.  Patient denies any diarrhea, no urinary symptoms, no fever or systemic symptoms.  Patient denies any chronic medical problems, no prior abdominal surgeries.    MEDICAL HISTORY REVIEW  Reviewed in EPIC    PAST MEDICAL HISTORY  Active Ambulatory Problems     Diagnosis Date Noted    No Active Ambulatory Problems     Resolved Ambulatory Problems     Diagnosis Date Noted    No Resolved Ambulatory Problems     No Additional Past Medical History       PAST SURGICAL HISTORY  No past surgical history on file.    FAMILY HISTORY  No family history on file.    SOCIAL HISTORY  Social History     Socioeconomic History    Marital status: Single       ALLERGIES  Patient has no known allergies.    The patient's allergies have been reviewed    REVIEW OF SYSTEMS  All systems reviewed and negative except for those discussed in HPI.     PHYSICAL EXAM  I have reviewed the triage vital signs and nursing notes.  ED Triage Vitals   Temp Heart Rate Resp BP SpO2   09/02/24 2123 09/02/24 2123 09/02/24 2123 09/02/24 2128 09/02/24 2123   98.5 °F (36.9 °C) 112 16 124/97 95 %      Temp src Heart Rate Source Patient Position BP Location FiO2 (%)   09/02/24 2123 09/02/24 2123 09/02/24 2128 09/02/24 2128 --   Tympanic  Monitor Sitting Left arm        General: No acute distress.  HENT: NCAT, PERRL, Nares patent.  Eyes: no scleral icterus.  Neck: trachea midline, no ROM limitations.  CV: regular rhythm, tachycardic rate.  Respiratory: normal effort, CTAB.  Abdomen: soft, nondistended, upper abdominal tenderness greatest in epigastrium, negative Mittal's, negative McBurney's, no rebound tenderness, no guarding or rigidity.  Musculoskeletal: no deformity.  Neuro: alert, moves all extremities, follows commands.  Skin: warm, dry.    LAB RESULTS  Recent Results (from the past 24 hour(s))   ECG 12 Lead Chest Pain    Collection Time: 09/02/24  9:35 PM   Result Value Ref Range    QT Interval 385 ms    QTC Interval 442 ms   Lipase    Collection Time: 09/02/24 10:12 PM    Specimen: Blood   Result Value Ref Range    Lipase 10 (L) 13 - 60 U/L   Comprehensive Metabolic Panel    Collection Time: 09/02/24 10:12 PM    Specimen: Blood   Result Value Ref Range    Glucose 86 65 - 99 mg/dL    BUN 18 6 - 20 mg/dL    Creatinine 0.89 0.76 - 1.27 mg/dL    Sodium 135 (L) 136 - 145 mmol/L    Potassium 4.0 3.5 - 5.2 mmol/L    Chloride 97 (L) 98 - 107 mmol/L    CO2 24.0 22.0 - 29.0 mmol/L    Calcium 9.5 8.6 - 10.5 mg/dL    Total Protein 8.3 6.0 - 8.5 g/dL    Albumin 4.1 3.5 - 5.2 g/dL    ALT (SGPT) 14 1 - 41 U/L    AST (SGOT) 14 1 - 40 U/L    Alkaline Phosphatase 121 (H) 39 - 117 U/L    Total Bilirubin 0.3 0.0 - 1.2 mg/dL    Globulin 4.2 gm/dL    A/G Ratio 1.0 g/dL    BUN/Creatinine Ratio 20.2 7.0 - 25.0    Anion Gap 14.0 5.0 - 15.0 mmol/L    eGFR 120.5 >60.0 mL/min/1.73   High Sensitivity Troponin T    Collection Time: 09/02/24 10:12 PM    Specimen: Blood   Result Value Ref Range    HS Troponin T <6 <22 ng/L   Green Top (Gel)    Collection Time: 09/02/24 10:12 PM   Result Value Ref Range    Extra Tube Hold for add-ons.    Lavender Top    Collection Time: 09/02/24 10:12 PM   Result Value Ref Range    Extra Tube hold for add-on    Gold Top - SST    Collection  Time: 09/02/24 10:12 PM   Result Value Ref Range    Extra Tube Hold for add-ons.    Light Blue Top    Collection Time: 09/02/24 10:12 PM   Result Value Ref Range    Extra Tube Hold for add-ons.    CBC Auto Differential    Collection Time: 09/02/24 10:12 PM    Specimen: Blood   Result Value Ref Range    WBC 14.05 (H) 3.40 - 10.80 10*3/mm3    RBC 5.13 4.14 - 5.80 10*6/mm3    Hemoglobin 13.9 13.0 - 17.7 g/dL    Hematocrit 41.8 37.5 - 51.0 %    MCV 81.5 79.0 - 97.0 fL    MCH 27.1 26.6 - 33.0 pg    MCHC 33.3 31.5 - 35.7 g/dL    RDW 12.8 12.3 - 15.4 %    RDW-SD 37.5 37.0 - 54.0 fl    MPV 11.3 6.0 - 12.0 fL    Platelets 227 140 - 450 10*3/mm3    Neutrophil % 79.9 (H) 42.7 - 76.0 %    Lymphocyte % 12.7 (L) 19.6 - 45.3 %    Monocyte % 6.8 5.0 - 12.0 %    Eosinophil % 0.0 (L) 0.3 - 6.2 %    Basophil % 0.2 0.0 - 1.5 %    Immature Grans % 0.4 0.0 - 0.5 %    Neutrophils, Absolute 11.22 (H) 1.70 - 7.00 10*3/mm3    Lymphocytes, Absolute 1.79 0.70 - 3.10 10*3/mm3    Monocytes, Absolute 0.96 (H) 0.10 - 0.90 10*3/mm3    Eosinophils, Absolute 0.00 0.00 - 0.40 10*3/mm3    Basophils, Absolute 0.03 0.00 - 0.20 10*3/mm3    Immature Grans, Absolute 0.05 0.00 - 0.05 10*3/mm3    nRBC 0.0 0.0 - 0.2 /100 WBC   High Sensitivity Troponin T 2Hr    Collection Time: 09/03/24 12:34 AM    Specimen: Blood   Result Value Ref Range    HS Troponin T 6 <22 ng/L    Troponin T Delta         I ordered the above labs and reviewed the results.    RADIOLOGY  CT Angiogram Chest Pulmonary Embolism, CT Abdomen Pelvis With Contrast    Result Date: 9/2/2024  CT ANGIOGRAM OF THE CHEST; CT OF THE ABDOMEN AND PELVIS  HISTORY: Shortness of air. Tachycardia. Epigastric pain.  COMPARISON: None available.  TECHNIQUE: Axial CT imaging was obtained through the thorax. IV contrast was administered. Three-D reformatted images were obtained. Axial CT imaging was obtained through the abdomen and pelvis. IV contrast was administered.  FINDINGS: CT OF THE CHEST: No acute pulmonary  thromboembolus is seen. Thoracic aorta is normal in caliber. There is no evidence of dissection of the aorta. However, there is an irregular appearance to the proximal celiac axis, which may reflect a dissection. There is an outpouching arising from the proximal celiac axis, which truncates rather abruptly. I'm uncertain of its etiology. A small saccular pseudoaneurysm is not excluded. This measures up to 7 x 5 mm. Superior mesenteric artery is widely patent. The thyroid gland, trachea, and esophagus appear unremarkable. There are no coronary artery calcifications. Patient does appear to have some minimal biapical scarring. No acute infiltrates are seen. Mediastinal lymph nodes do not appear pathologically enlarged. No acute osseous abnormalities are seen..  CT OF THE ABDOMEN PELVIS: No suspicious hepatic lesions are seen. The stomach, duodenum, adrenal glands, spleen, pancreas, and gallbladder are normal. The kidneys enhance symmetrically. No hydronephrosis is seen. Urinary bladder and prostate gland are within normal limits. There is no bowel obstruction. There is no evidence of appendicitis. No acute osseous abnormalities are seen.       1. No acute pulmonary thromboembolus is seen. 2. The patient has an irregular appearance to the proximal celiac axis, which raises the possibility of dissection. The celiac axis does remain patent. The left gastric artery, splenic artery, and common hepatic artery are all patent. There is a small outpouching which is seen arising from the proximal celiac axis. 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 Vascular surgery consultation is recommended.  Radiation dose reduction techniques were utilized, including automated exposure control and exposure modulation based on body size.   This report was finalized on 9/2/2024 11:17 PM by Dr. Enriqueta Vivas M.D on Workstation: [id number]      XR Chest 1 View    Result Date: 9/2/2024  SINGLE VIEW OF THE  CHEST  HISTORY: Chest and abdominal pain  COMPARISON: None available.  FINDINGS: Heart size is within normal limits. No pneumothorax, pleural effusion, or acute infiltrate is seen.      No acute findings.  This report was finalized on 9/2/2024 10:38 PM by Dr. Enriqueta Vivas M.D on Workstation: [id number]       I ordered the above noted radiological studies. I reviewed the images and results. I agree with the radiologist interpretation.    PROCEDURES  Procedures    MEDICATIONS GIVEN IN ER  Medications   sodium chloride 0.9 % flush 10 mL (has no administration in time range)   morphine injection 2 mg (2 mg Intravenous Given 9/3/24 0038)   ondansetron (ZOFRAN) injection 8 mg (8 mg Intravenous Given 9/2/24 2309)   sodium chloride 0.9 % bolus 1,000 mL (0 mL Intravenous Stopped 9/3/24 0038)   iopamidol (ISOVUE-370) 76 % injection 100 mL (95 mL Intravenous Given 9/2/24 2244)       PROGRESS, DATA ANALYSIS, CONSULTS, AND MEDICAL DECISION MAKING  A complete history and physical exam have been performed.  All available laboratory and imaging results have been reviewed by myself prior to disposition.    MDM    After the initial H&P, I discussed pertinent information from history and physical exam with patient/family.  Discussed differential diagnosis.  Discussed plan for ED evaluation/workup/treatment.  All questions answered.  Patient/family is agreeable with plan.  ED Course as of 09/03/24 0122   Mon Sep 02, 2024   2152 My differential diagnosis for chest pain includes but is not limited to:  Muscle strain, costochondritis, myositis, pleurisy, rib fracture, intercostal neuritis, herpes zoster, tumor, myocardial infarction, coronary syndrome, unstable angina, angina, aortic dissection, mitral valve prolapse, pericarditis, palpitations, pulmonary embolus, pneumonia, pneumothorax, lung cancer, GERD, esophagitis, esophageal spasm     [JG]   2204 EKG independently viewed and contemporaneously interpreted by ED physician.  Time: 9:35 PM.  Rate 79.  Interpretation: Normal sinus rhythm, right axis deviation, right bundle branch block, left posterior fascicular block, no acute ST changes. [JG]   2205 No prior EKG for comparison. [JG]   Tue Sep 03, 2024   0002 I reviewed CT abdomen pelvis in PACS, no pancreatitis per my read. [JG]   0003 Reviewed CT angiogram in PACS, no pulmonary embolism per my read. [JG]   0018 CT abdomen pelvis concerning for abnormality related to his celiac axis, vascular surgery consultation recommended, consulting vascular surgery. [JG]   0031 Phone call with Dr. Mcdowell, vascular surgery.  Discussed the patient, relevant history, exam, diagnostics, ED findings/progress, and concerns.  He recommends admission to hospitalist, does not recommend any treatment at present, plans for repeat CT angiogram after 24 hours.  They agree to consult.    [JG]   0119 Patient reassessed.  Discussed ED findings, differential diagnosis, and the need for admission for evaluation/treatment.  They are agreeable to admission and all questions were answered.     [JG]   0119 Phone call with DEVANTE Cline for A.  Discussed the patient, relevant history, exam, diagnostics, ED findings/progress, and concerns. They agree to admit the patient to telemetry observation under Dr. Mitchell. Care assumed by the admitting physician at this time.     [JG]      ED Course User Index  [JG] Tim Hoang MD       AS OF 01:22 EDT VITALS:    BP - (!) 146/101  HR - 81  TEMP - 98.5 °F (36.9 °C) (Tympanic)  O2 SATS - 98%    DIAGNOSIS  Final diagnoses:   Chest pain, unspecified type   Epigastric pain   Nausea and vomiting, unspecified vomiting type   Leukocytosis, unspecified type   Hypertension, unspecified type   Abnormal computed tomography angiography (CTA) of abdomen and pelvis         DISPOSITION  ADMISSION    Discussed treatment plan and reason for admission with pt/family and admitting physician.  Pt/family voiced understanding of the plan for  admission for further testing/treatment as needed.        Tim Hoang MD  09/03/24 0122

## 2024-09-03 NOTE — H&P
"[facility]   HISTORY AND PHYSICAL    Patient Name: Anders Figueredo  : 1995  MRN: [id number]  Primary Care Physician:  Provider, No Known  Date of admission: 2024    Subjective   Subjective     Chief Complaint: chest pain    History of Present Illness  Very pleasant 27yo gentleman with no significant past medical history, who presented to ER with c/o 2d of constant \"burning\" chest and upper abd pain radiating into neck. No F/C/NS. Symptoms non-exertional. Reports nausea and 2 episodes of vomiting--no blood or bile. SOA at times. No diarrhea. No ill contacts.   Says he was told years ago at a sports physical for football at [facility] that his \"heart beat backwards\" and provider kept asking him if he'd had heart surgery as an infant. Pt was cleared to play football and nothing ever came of it.       Review of Systems   Constitutional:  Positive for diaphoresis. Negative for appetite change, chills and fever.   HENT:  Negative for congestion, nosebleeds, rhinorrhea, sore throat, trouble swallowing and voice change.    Eyes:  Negative for pain and visual disturbance.   Respiratory:  Positive for shortness of breath. Negative for cough, choking, wheezing and stridor.    Cardiovascular:  Positive for chest pain. Negative for palpitations and leg swelling.   Gastrointestinal:  Positive for abdominal pain (epigastric), nausea and vomiting. Negative for blood in stool and diarrhea.   Endocrine: Negative for cold intolerance and heat intolerance.   Genitourinary:  Negative for decreased urine volume, difficulty urinating, dysuria and hematuria.   Musculoskeletal:  Negative for arthralgias, back pain and myalgias.   Skin:  Negative for color change and rash.   Allergic/Immunologic: Negative for environmental allergies and food allergies.   Neurological:  Negative for seizures, syncope, facial asymmetry, speech difficulty and headaches.   Hematological:  Negative for adenopathy. Does not bruise/bleed easily. "   Psychiatric/Behavioral:  Negative for behavioral problems, confusion and hallucinations.         Personal History     History reviewed. No pertinent past medical history.    History reviewed. No pertinent surgical history.    Family History: family history is not on file. Otherwise pertinent FHx was reviewed and not pertinent to current issue.    Social History:  reports that he has never smoked. He has never used smokeless tobacco. He reports current drug use. Drug: Marijuana.    Home Medications:       Allergies:  No Known Allergies    Objective    Objective     Vitals:   Temp:  [98.1 °F (36.7 °C)-98.5 °F (36.9 °C)] 98.1 °F (36.7 °C)  Heart Rate:  [] 72  Resp:  [16] 16  BP: (124-146)/() 141/90    Physical Exam  Vitals and nursing note reviewed.   Constitutional:       General: He is not in acute distress.     Appearance: He is not ill-appearing, toxic-appearing or diaphoretic.   HENT:      Head: Normocephalic.      Nose: Nose normal.      Mouth/Throat:      Mouth: Mucous membranes are moist.      Pharynx: Oropharynx is clear.   Eyes:      General: No scleral icterus.        Right eye: No discharge.         Left eye: No discharge.      Extraocular Movements: Extraocular movements intact.      Conjunctiva/sclera: Conjunctivae normal.   Cardiovascular:      Rate and Rhythm: Normal rate and regular rhythm.      Pulses: Normal pulses.      Heart sounds: Normal heart sounds. No murmur heard.  Pulmonary:      Effort: Pulmonary effort is normal. No respiratory distress.      Breath sounds: Normal breath sounds. No wheezing or rales.   Chest:      Chest wall: Tenderness present.   Abdominal:      General: Bowel sounds are normal. There is no distension.      Palpations: Abdomen is soft. There is no mass.      Tenderness: There is abdominal tenderness (Epigastric). There is no guarding or rebound.   Musculoskeletal:         General: No swelling or deformity. Normal range of motion.      Cervical back: Neck  supple.   Skin:     General: Skin is warm and dry.      Capillary Refill: Capillary refill takes less than 2 seconds.      Coloration: Skin is not jaundiced.      Comments: Tattoos   Neurological:      General: No focal deficit present.      Mental Status: He is alert and oriented to person, place, and time. Mental status is at baseline.      Cranial Nerves: No cranial nerve deficit.      Coordination: Coordination normal.   Psychiatric:         Mood and Affect: Mood normal.         Behavior: Behavior normal.         Thought Content: Thought content normal.         Result Review    Result Review:  I have personally reviewed the results from the time of this admission to 9/3/2024 08:20 EDT and agree with these findings:  [x]  Laboratory list / accordion  []  Microbiology  [x]  Radiology  [x]  EKG/Telemetry   []  Cardiology/Vascular   []  Pathology  []  Old records  []  Other:  Most notable findings include: CTA chest/abd/pelvis showed no PE but did show irregularity of celiac axis--concern for dissection  CXR NAD  EKG--LPFB and RBBB, Trop neg x 3  CMP fine, Lipase 10  WBC 14->13.4, Hgb 12, Plt 245      Assessment & Plan   Assessment / Plan     Brief Patient Summary:  Anders Figueredo is a 28 y.o. male who presents with chest and upper abd pain and is admitted with abnormality of celiac axis on CTA as well as leukocytosis and abnl EKG.    Active Hospital Problems:  Active Hospital Problems    Diagnosis     **Abnormal computed tomography angiography (CTA) of abdomen and pelvis     Anemia     Leukocytosis     Chest pain     Nausea & vomiting     Dyspnea     Abnormal EKG      Plan:   Chest pain  Abnl celiac axis on CTA  N/V  Dyspnea  Appreciate Vasc Surg attention to pt  Repeating CTA with closer attention to celiac axis  Concern for vasculitis/CTD in pt this young so checking inflammatory markers  Symptoms sound most c/w GERD/esophagitis with spasm, will start BID PPI and Carafate QAC and QHS--see if there is any  improvement    Abnl EKG  Report of some vague cardiac abnormality in past  Have asked Card to see here    Leukocytosis  Suspect reactive  Afebrile  Falling w/o tx  Continue to monitor closely  Check blood cultures if any fever    Anemia NOS  Check iron panel    Further orders to follow as suggested by evolving hospital course     D/w pt and Dr. Mcdowell    VTE Prophylaxis:  Mechanical VTE prophylaxis orders are present.    CODE STATUS:    Code Status (Patient has no pulse and is not breathing): CPR (Attempt to Resuscitate)  Medical Interventions (Patient has pulse or is breathing): Full Support    Admission Status:  I believe this patient meets observation status.    Blayne Meyers MD

## 2024-09-03 NOTE — CASE MANAGEMENT/SOCIAL WORK
Discharge Planning Assessment  [facility]     Patient Name: Anders Figueredo  MRN: [id number]  Today's Date: 9/3/2024    Admit Date: 9/2/2024    Plan: plans home; follow for needs   Discharge Needs Assessment       Row Name 09/03/24 1132       Living Environment    People in Home child(kevyn), dependent    Current Living Arrangements home    Primary Care Provided by self    Provides Primary Care For child(kevyn)    Family Caregiver if Needed none    Quality of Family Relationships unable to assess    Able to Return to Prior Arrangements yes       Resource/Environmental Concerns    Resource/Environmental Concerns none       Transition Planning    Patient/Family Anticipates Transition to home    Patient/Family Anticipated Services at Transition none    Transportation Anticipated family or friend will provide       Discharge Needs Assessment    Readmission Within the Last 30 Days no previous admission in last 30 days    Equipment Currently Used at Home none    Concerns to be Addressed no discharge needs identified;denies needs/concerns at this time    Equipment Needed After Discharge none    Provided Post Acute Provider List? N/A                   Discharge Plan       Row Name 09/03/24 1133       Plan    Plan plans home; follow for needs    Patient/Family in Agreement with Plan yes    Plan Comments Spoke with pt bedside. Confirmed facesheet correct. Explained role of CCP. Pt reports he lives with somone but didn't want to list contact or name. No emergency contacts listed and declines to list anyone. Pt has 2 year old child he cares for. Pt doesn't use any DME or have any HH/SNF history. No PCP and declined list. Pt plans home. He does qualify for Medicaid pending PE. Pt declines d/c needs. CCP to follow.                  Continued Care and Services - Admitted Since 9/2/2024    No active coordination exists for this encounter.          Demographic Summary    No documentation.                  Functional Status    No  documentation.                  Psychosocial    No documentation.                  Abuse/Neglect    No documentation.                  Legal    No documentation.                  Substance Abuse    No documentation.                  Patient Forms    No documentation.                     FABIANO White

## 2024-09-03 NOTE — PLAN OF CARE
Goal Outcome Evaluation:  Plan of Care Reviewed With: patient        Progress: improving  Outcome Evaluation: Pt VS stable. Pt went for CTA of abd. Had a 2D echo. Pt has reported pain of upper abd and midsternal area. See eMAR for interventions. Pt has been changed to a regular diet. Pt safety maintained.

## 2024-09-03 NOTE — CONSULTS
"Patient Name: Anders Figueredo  :1995  28 y.o.    Date of Admission: 2024  Encounter Provider: Moni Coelho MD  Date of Encounter Visit: 24  Place of Service: [facility] CARDIOLOGY  Referring Provider: Ryan Mitchell MD  Patient Care Team:  Provider, No Known as PCP - General      Chief complaint:chest pain       History of Present Illness:    28-year-old without significant past medical history came to the ER on  with 2 days of burning chest pain and upper abdominal pain rating to his back.  It was associated with shortness of breath it was not exertional.    In the ER,  sodium 135, troponin T negative, WBC 14.05, CT of chest/ABD/pelvis showed no PE, an irregular appearance to the proximal celiac axis, which raises the possibility of dissection. The celiac axis does remain patent. The left gastric artery, splenic artery, and common hepatic artery are all patent. There is a small outpouching which is seen arising from the proximal celiac axis. 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  Chest x-ray showed nothing acute, EKG showed normal sinus rhythm rate 79, right bundle branch block, left posterior fascicular block, no acute ST changes.      In 9th grade, he had a football physical and was told that his heart \"beats backwards.\"  He said he did a treadmill and other heart testing and everything came out normal and he was allowed to play football.    History reviewed. No pertinent past medical history.    History reviewed. No pertinent surgical history.      Prior to Admission medications    Not on File       No Known Allergies    Social History     Socioeconomic History    Marital status: Single   Tobacco Use    Smoking status: Never    Smokeless tobacco: Never   Vaping Use    Vaping status: Never Used   Substance and Sexual Activity    Drug use: Yes     Types: Marijuana     Comment: Pt reports smoking cannibis "       History reviewed. No pertinent family history.    REVIEW OF SYSTEMS:   All other systems reviewed and negative.        Objective:     Vitals:    09/03/24 0001 09/03/24 0101 09/03/24 0220 09/03/24 0725   BP: (!) 146/101 140/90 143/92 141/90   BP Location:   Left arm Left arm   Patient Position:   Lying Lying   Pulse: 81 70 65 72   Resp:   16 16   Temp:   98.1 °F (36.7 °C) 98.1 °F (36.7 °C)   TempSrc:   Oral Oral   SpO2: 98% 100% 97% 98%   Weight:       Height:         Body mass index is 20.92 kg/m².    Intake/Output Summary (Last 24 hours) at 9/3/2024 1011  Last data filed at 9/3/2024 0038  Gross per 24 hour   Intake 1000 ml   Output --   Net 1000 ml     Constitutional: He is oriented to person, place, and time. He appears well-developed. He does not appear ill.   HENT:   Head: Normocephalic and atraumatic. Head is without contusion.   Right Ear: Hearing normal. No drainage.   Left Ear: Hearing normal. No drainage.   Nose: No nasal deformity. No epistaxis.   Eyes: Lids are normal. Right eye exhibits no exudate. Left eye exhibits no exudate.  Neck: No JVD present. Carotid bruit is not present. No tracheal deviation present. No thyroid mass and no thyromegaly present.   Cardiovascular: Normal rate, regular rhythm and normal heart sounds.    Pulses:       Posterior tibial pulses are 2+ on the right side, and 2+ on the left side.   Pulmonary/Chest: Effort normal and breath sounds normal.   Abdominal: Soft. Normal appearance and bowel sounds are normal. There is no tenderness.   Musculoskeletal: Normal range of motion.        Right shoulder: He exhibits no deformity.        Left shoulder: He exhibits no deformity.   Neurological: He is alert and oriented to person, place, and time. He has normal strength.   Skin: Skin is warm, dry and intact. No rash noted.   Psychiatric: He has a normal mood and affect. His behavior is normal. Thought content normal.   Vitals reviewed      Lab Review:     Results from last 7 days    Lab Units 09/03/24  0420 09/02/24  2212   SODIUM mmol/L 135* 135*   POTASSIUM mmol/L 4.3 4.0   CHLORIDE mmol/L 101 97*   CO2 mmol/L 23.0 24.0   BUN mg/dL 16 18   CREATININE mg/dL 0.76 0.89   CALCIUM mg/dL 8.8 9.5   BILIRUBIN mg/dL  --  0.3   ALK PHOS U/L  --  121*   ALT (SGPT) U/L  --  14   AST (SGOT) U/L  --  14   GLUCOSE mg/dL 79 86     Results from last 7 days   Lab Units 09/03/24  0420 09/03/24  0034 09/02/24  2212   HSTROP T ng/L 7 6 <6     Results from last 7 days   Lab Units 09/03/24  0420   WBC 10*3/mm3 13.39*   HEMOGLOBIN g/dL 12.0*   HEMATOCRIT % 36.7*   PLATELETS 10*3/mm3 245                                 I personally viewed and interpreted the patient's EKG/Telemetry data.        Assessment and Plan:       1.  Chest pain.  2 negative high-sensitivity troponins.  EKG without acute ST changes.  I reviewed the CTA of his chest myself.  This is not a gated study.  However, I can see the origins of the coronary arteries and he has no anomalous coronary arteries or evidence of disease in the proximal vessels.  I have a low suspicion that his pain is cardiac in origin.  2.  Right bundle branch block and left posterior fascicular block.  3.  Questionable issue with the celiac artery.  Vascular surgery has been consulted.    -Check an echocardiogram.  I have a low suspicion that there are any acute cardiac issues.  He has probably had this right bundle branch block for a while based upon the fact that he was told that he had something abnormal as a teen.  If his echocardiogram is normal, I do not recommend any further cardiac testing at this time.    Moni Coelho MD  09/03/24  10:11 EDT

## 2024-09-04 PROBLEM — R10.13 EPIGASTRIC PAIN: Status: ACTIVE | Noted: 2024-09-02

## 2024-09-04 LAB
ALBUMIN SERPL-MCNC: 3.4 G/DL (ref 3.5–5.2)
ALBUMIN/GLOB SERPL: 0.9 G/DL
ALP SERPL-CCNC: 91 U/L (ref 39–117)
ALT SERPL W P-5'-P-CCNC: 12 U/L (ref 1–41)
ANION GAP SERPL CALCULATED.3IONS-SCNC: 12.7 MMOL/L (ref 5–15)
AST SERPL-CCNC: 14 U/L (ref 1–40)
BILIRUB SERPL-MCNC: 0.3 MG/DL (ref 0–1.2)
BUN SERPL-MCNC: 12 MG/DL (ref 6–20)
BUN/CREAT SERPL: 15.2 (ref 7–25)
CALCIUM SPEC-SCNC: 8.8 MG/DL (ref 8.6–10.5)
CHLORIDE SERPL-SCNC: 100 MMOL/L (ref 98–107)
CHOLEST SERPL-MCNC: 109 MG/DL (ref 0–200)
CO2 SERPL-SCNC: 24.3 MMOL/L (ref 22–29)
CREAT SERPL-MCNC: 0.79 MG/DL (ref 0.76–1.27)
DEPRECATED RDW RBC AUTO: 35.7 FL (ref 37–54)
EGFRCR SERPLBLD CKD-EPI 2021: 124.9 ML/MIN/1.73
ERYTHROCYTE [DISTWIDTH] IN BLOOD BY AUTOMATED COUNT: 12.6 % (ref 12.3–15.4)
GLOBULIN UR ELPH-MCNC: 3.6 GM/DL
GLUCOSE SERPL-MCNC: 80 MG/DL (ref 65–99)
HCT VFR BLD AUTO: 36.3 % (ref 37.5–51)
HDLC SERPL-MCNC: 22 MG/DL (ref 40–60)
HGB BLD-MCNC: 12.1 G/DL (ref 13–17.7)
LDLC SERPL CALC-MCNC: 66 MG/DL (ref 0–100)
LDLC/HDLC SERPL: 2.96 {RATIO}
LIPASE SERPL-CCNC: 13 U/L (ref 13–60)
MAGNESIUM SERPL-MCNC: 1.9 MG/DL (ref 1.6–2.6)
MCH RBC QN AUTO: 26.9 PG (ref 26.6–33)
MCHC RBC AUTO-ENTMCNC: 33.3 G/DL (ref 31.5–35.7)
MCV RBC AUTO: 80.7 FL (ref 79–97)
PHOSPHATE SERPL-MCNC: 2.3 MG/DL (ref 2.5–4.5)
PLATELET # BLD AUTO: 232 10*3/MM3 (ref 140–450)
PMV BLD AUTO: 10.9 FL (ref 6–12)
POTASSIUM SERPL-SCNC: 3.9 MMOL/L (ref 3.5–5.2)
PROT SERPL-MCNC: 7 G/DL (ref 6–8.5)
RBC # BLD AUTO: 4.5 10*6/MM3 (ref 4.14–5.8)
SODIUM SERPL-SCNC: 137 MMOL/L (ref 136–145)
TRIGL SERPL-MCNC: 109 MG/DL (ref 0–150)
VLDLC SERPL-MCNC: 21 MG/DL (ref 5–40)
WBC NRBC COR # BLD AUTO: 9.7 10*3/MM3 (ref 3.4–10.8)

## 2024-09-04 PROCEDURE — 83735 ASSAY OF MAGNESIUM: CPT | Performed by: HOSPITALIST

## 2024-09-04 PROCEDURE — 83690 ASSAY OF LIPASE: CPT | Performed by: HOSPITALIST

## 2024-09-04 PROCEDURE — 25810000003 SODIUM CHLORIDE 0.9 % SOLUTION: Performed by: NURSE PRACTITIONER

## 2024-09-04 PROCEDURE — 96361 HYDRATE IV INFUSION ADD-ON: CPT

## 2024-09-04 PROCEDURE — 96376 TX/PRO/DX INJ SAME DRUG ADON: CPT

## 2024-09-04 PROCEDURE — 25010000002 MORPHINE PER 10 MG: Performed by: NURSE PRACTITIONER

## 2024-09-04 PROCEDURE — 99204 OFFICE O/P NEW MOD 45 MIN: CPT | Performed by: NURSE PRACTITIONER

## 2024-09-04 PROCEDURE — 84100 ASSAY OF PHOSPHORUS: CPT | Performed by: HOSPITALIST

## 2024-09-04 PROCEDURE — 85027 COMPLETE CBC AUTOMATED: CPT | Performed by: HOSPITALIST

## 2024-09-04 PROCEDURE — G0378 HOSPITAL OBSERVATION PER HR: HCPCS

## 2024-09-04 PROCEDURE — 80053 COMPREHEN METABOLIC PANEL: CPT | Performed by: HOSPITALIST

## 2024-09-04 PROCEDURE — 80061 LIPID PANEL: CPT | Performed by: HOSPITALIST

## 2024-09-04 PROCEDURE — 99213 OFFICE O/P EST LOW 20 MIN: CPT | Performed by: SURGERY

## 2024-09-04 RX ORDER — MORPHINE SULFATE 2 MG/ML
1 INJECTION, SOLUTION INTRAMUSCULAR; INTRAVENOUS EVERY 4 HOURS PRN
Status: DISCONTINUED | OUTPATIENT
Start: 2024-09-04 | End: 2024-09-06 | Stop reason: HOSPADM

## 2024-09-04 RX ORDER — HYDROCODONE BITARTRATE AND ACETAMINOPHEN 5; 325 MG/1; MG/1
1 TABLET ORAL EVERY 6 HOURS PRN
Status: DISCONTINUED | OUTPATIENT
Start: 2024-09-04 | End: 2024-09-06 | Stop reason: HOSPADM

## 2024-09-04 RX ADMIN — PANTOPRAZOLE SODIUM 40 MG: 40 TABLET, DELAYED RELEASE ORAL at 17:02

## 2024-09-04 RX ADMIN — SUCRALFATE 1 G: 1 TABLET ORAL at 21:31

## 2024-09-04 RX ADMIN — SODIUM CHLORIDE 75 ML/HR: 9 INJECTION, SOLUTION INTRAVENOUS at 05:17

## 2024-09-04 RX ADMIN — HYDROCODONE BITARTRATE AND ACETAMINOPHEN 1 TABLET: 5; 325 TABLET ORAL at 11:34

## 2024-09-04 RX ADMIN — SUCRALFATE 1 G: 1 TABLET ORAL at 17:02

## 2024-09-04 RX ADMIN — Medication 10 ML: at 09:51

## 2024-09-04 RX ADMIN — HYDROCODONE BITARTRATE AND ACETAMINOPHEN 1 TABLET: 5; 325 TABLET ORAL at 21:31

## 2024-09-04 RX ADMIN — SUCRALFATE 1 G: 1 TABLET ORAL at 11:07

## 2024-09-04 RX ADMIN — MORPHINE SULFATE 2 MG: 2 INJECTION, SOLUTION INTRAMUSCULAR; INTRAVENOUS at 08:21

## 2024-09-04 RX ADMIN — PANTOPRAZOLE SODIUM 40 MG: 40 TABLET, DELAYED RELEASE ORAL at 08:18

## 2024-09-04 RX ADMIN — Medication 10 ML: at 21:30

## 2024-09-04 RX ADMIN — SUCRALFATE 1 G: 1 TABLET ORAL at 08:18

## 2024-09-04 RX ADMIN — MORPHINE SULFATE 2 MG: 2 INJECTION, SOLUTION INTRAMUSCULAR; INTRAVENOUS at 05:21

## 2024-09-04 RX ADMIN — MORPHINE SULFATE 2 MG: 2 INJECTION, SOLUTION INTRAMUSCULAR; INTRAVENOUS at 02:19

## 2024-09-04 NOTE — PLAN OF CARE
Goal Outcome Evaluation:   Result of 2D echo shows an EF of 65%. Up ad vilma to the rest room. Still complaining of abdominal and upper chest pain , medicated with PRN morphine per request. No other complaints made , continue to monitor.

## 2024-09-04 NOTE — PROGRESS NOTES
"Name: Anders Figueredo ADMIT: 2024   : 1995  PCP: Provider, No Known    MRN: [id number] LOS: 0 days   AGE/SEX: 28 y.o. male  ROOM: [address]    Chief Complaint   Patient presents with    Chest Pain    Abdominal Pain     CC: Epigastric pain  Subjective     28 y.o. male with epigastric pain.  Initial CT question whether he could have a pseudoaneurysm and/or dissection at the celiac axis.  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  Basically this looks like a normal variant anatomy.    Review of Systems epigastric pain persists    Objective     Scheduled Medications:   pantoprazole, 40 mg, Oral, BID AC  sodium chloride, 10 mL, Intravenous, Q12H  sucralfate, 1 g, Oral, 4x Daily AC & at Bedtime        Active Infusions:       As Needed Medications:    acetaminophen **OR** acetaminophen **OR** acetaminophen    senna-docusate sodium **AND** polyethylene glycol **AND** bisacodyl **AND** bisacodyl    calcium carbonate    HYDROcodone-acetaminophen    Morphine    nitroglycerin    ondansetron ODT **OR** ondansetron    sodium chloride    sodium chloride    sodium chloride    Vital Signs  Vital Signs Patient Vitals for the past 24 hrs:   BP Temp Temp src Pulse Resp SpO2 Height Weight   24 0727 120/82 98.4 °F (36.9 °C) Oral 81 18 98 % -- --   24 2300 126/71 99.1 °F (37.3 °C) Oral 83 16 96 % -- --   24 1920 134/85 97.9 °F (36.6 °C) Oral 82 16 95 % -- --   24 1459 132/91 -- -- 80 -- -- 180.3 cm (71\") 68 kg (150 lb)   24 1319 132/91 98.4 °F (36.9 °C) Oral 71 16 96 % -- --     Vital Signs (range)  Temp:  [97.9 °F (36.6 °C)-99.1 °F (37.3 °C)] 98.4 °F (36.9 °C)  Heart Rate:  [71-83] 81  Resp:  [16-18] 18  BP: " "(120-134)/(71-91) 120/82  I/O:  I/O last 3 completed shifts:  In: 3017.5 [I.V.:2017.5; IV Piggyback:1000]  Out: 600 [Urine:600]  I/O:   Intake/Output Summary (Last 24 hours) at 9/4/2024 1304  Last data filed at 9/4/2024 0517  Gross per 24 hour   Intake 2017.5 ml   Output 600 ml   Net 1417.5 ml     BMI:  Body mass index is 20.92 kg/m².    Physical Exam:  Physical Exam   Epigastric area tender without rebound.  Abdomen flat  Results Review:     CBC    Results from last 7 days   Lab Units 09/04/24 0307 09/03/24 0420 09/02/24  2212   WBC 10*3/mm3 9.70 13.39* 14.05*   HEMOGLOBIN g/dL 12.1* 12.0* 13.9   PLATELETS 10*3/mm3 232 245 227     BMP   Results from last 7 days   Lab Units 09/04/24 0307 09/03/24 0420 09/02/24  2212   SODIUM mmol/L 137 135* 135*   POTASSIUM mmol/L 3.9 4.3 4.0   CHLORIDE mmol/L 100 101 97*   CO2 mmol/L 24.3 23.0 24.0   BUN mg/dL 12 16 18   CREATININE mg/dL 0.79 0.76 0.89   GLUCOSE mg/dL 80 79 86   MAGNESIUM mg/dL 1.9  --   --    PHOSPHORUS mg/dL 2.3*  --   --      Cr Clearance Estimated Creatinine Clearance: 135.1 mL/min (by C-G formula based on SCr of 0.79 mg/dL).  Coag     HbA1C No results found for: \"HGBA1C\"  Blood Glucose No results found for: \"POCGLU\"  Infection     CMP   Results from last 7 days   Lab Units 09/04/24 0307 09/03/24 0420 09/02/24  2212   SODIUM mmol/L 137 135* 135*   POTASSIUM mmol/L 3.9 4.3 4.0   CHLORIDE mmol/L 100 101 97*   CO2 mmol/L 24.3 23.0 24.0   BUN mg/dL 12 16 18   CREATININE mg/dL 0.79 0.76 0.89   GLUCOSE mg/dL 80 79 86   ALBUMIN g/dL 3.4*  --  4.1   BILIRUBIN mg/dL 0.3  --  0.3   ALK PHOS U/L 91  --  121*   AST (SGOT) U/L 14  --  14   ALT (SGPT) U/L 12  --  14   LIPASE U/L 13  --  10*     ABG      Radiology(recent) CT Angiogram Chest Pulmonary Embolism    Result Date: 9/2/2024   1. No acute pulmonary thromboembolus is seen. 2. The patient has an irregular appearance to the proximal celiac axis, which raises the possibility of dissection. The celiac axis does " remain patent. The left gastric artery, splenic artery, and common hepatic artery are all patent. There is a small outpouching which is seen arising from the proximal celiac axis. 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 Vascular surgery consultation is recommended.  Radiation dose reduction techniques were utilized, including automated exposure control and exposure modulation based on body size.   This report was finalized on 9/2/2024 11:17 PM by Dr. Enriqueta Vivas M.D on Workstation: [email or web address]      CT Abdomen Pelvis With Contrast    Result Date: 9/2/2024   1. No acute pulmonary thromboembolus is seen. 2. The patient has an irregular appearance to the proximal celiac axis, which raises the possibility of dissection. The celiac axis does remain patent. The left gastric artery, splenic artery, and common hepatic artery are all patent. There is a small outpouching which is seen arising from the proximal celiac axis. 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 Vascular surgery consultation is recommended.  Radiation dose reduction techniques were utilized, including automated exposure control and exposure modulation based on body size.   This report was finalized on 9/2/2024 11:17 PM by Dr. Enriqueta Vivas M.D on Workstation: [email or web address]      XR Chest 1 View    Result Date: 9/2/2024  No acute findings.  This report was finalized on 9/2/2024 10:38 PM by Dr. Enriqueta Vivas M.D on Workstation: [email or web address]       Assessment & Plan     Assessment & Plan      Abnormal computed tomography angiography (CTA) of abdomen and pelvis    Anemia    Leukocytosis    Chest pain    Nausea & vomiting    Dyspnea    Abnormal EKG      28 y.o. 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  At this point I do not believe the arteries are involved in his pain and upper endoscopy and further workup by  GI is necessary.  Will check along with you but at this point long-term follow-up of this area with duplex and possibly CT could be planned however once again I do not believe is currently related to his pain.      Noel Mcdowell MD  09/04/24  13:03 EDT    Please call my office with any question: [phone]    Active Hospital Problems    Diagnosis  POA    **Abnormal computed tomography angiography (CTA) of abdomen and pelvis [R93.5]  Yes    Anemia [D64.9]  No    Leukocytosis [D72.829]  Yes    Chest pain [R07.9]  Yes    Nausea & vomiting [R11.2]  Yes    Dyspnea [R06.00]  Yes    Abnormal EKG [R94.31]  Yes      Resolved Hospital Problems   No resolved problems to display.

## 2024-09-04 NOTE — CONSULTS
Gastroenterology   Initial Inpatient Consult Note    Referring Provider: Dr faye    Reason for Consultation: Abdominal pain, abnormal imaging    History of present illness:    Gastroenterology has been consulted on this 28 y.o. male for abdominal pain and abnormal CT scan.  Patient presented to emergency department with upper abdominal pain described as burning radiating to his neck.  He had associated nausea and 2 episodes of vomiting without hematemesis or bile.  No previous EGD.  Denies history of heartburn.  Denies dysphagia.  Denies alcohol or tobacco use.  Daily marijuana use.        Past Medical History:  History reviewed. No pertinent past medical history.  Past Surgical History:  History reviewed. No pertinent surgical history.   Social History:   Social History     Tobacco Use    Smoking status: Never    Smokeless tobacco: Never   Substance Use Topics    Alcohol use: Not on file      Family History:  History reviewed. No pertinent family history.    Home Meds:  No medications prior to admission.     Current Meds:   pantoprazole, 40 mg, Oral, BID AC  sodium chloride, 10 mL, Intravenous, Q12H  sucralfate, 1 g, Oral, 4x Daily AC & at Bedtime      Allergies:  No Known Allergies  Review of Systems  Pertinent items are noted in HPI, all other systems reviewed and negative    Objective     Vital Signs  Temp:  [97.5 °F (36.4 °C)-99.1 °F (37.3 °C)] 97.5 °F (36.4 °C)  Heart Rate:  [71-83] 71  Resp:  [16-18] 18  BP: (116-134)/(71-85) 116/85    Physical Exam:  CONSTITUTIONAL:  today's vital signs reviewed  EARS NOSE THROAT: trachea midline and no deformity of the nares  EYES: no scleral icterus  GASTROINTESTINAL: abdomen is soft, upper abdominal tenderness with light palpation, nondistended with normal active bowel sounds, no masses are appreciated  PSYCHIATRIC: appropriate mood and affect  RESPIRATORY: normal inspiratory effort with no increased work of breathing  NEUROLOGIC: patient is awake and  alert  DERMATOLOGIC: skin is warm with no cyanosis  LYMPHATIC: no periumbilical lymphadenopathy     Results Review:              I reviewed the patient's new clinical results.    Results from last 7 days   Lab Units 09/04/24  0307 09/03/24 0420 09/02/24 2212   WBC 10*3/mm3 9.70 13.39* 14.05*   HEMOGLOBIN g/dL 12.1* 12.0* 13.9   HEMATOCRIT % 36.3* 36.7* 41.8   PLATELETS 10*3/mm3 232 245 227     Results from last 7 days   Lab Units 09/04/24  0307 09/03/24  0420 09/02/24  2212   SODIUM mmol/L 137 135* 135*   POTASSIUM mmol/L 3.9 4.3 4.0   CHLORIDE mmol/L 100 101 97*   CO2 mmol/L 24.3 23.0 24.0   BUN mg/dL 12 16 18   CREATININE mg/dL 0.79 0.76 0.89   CALCIUM mg/dL 8.8 8.8 9.5   BILIRUBIN mg/dL 0.3  --  0.3   ALK PHOS U/L 91  --  121*   ALT (SGPT) U/L 12  --  14   AST (SGOT) U/L 14  --  14   GLUCOSE mg/dL 80 79 86         Lab Results   Lab Value Date/Time    LIPASE 13 09/04/2024 0307    LIPASE 10 (L) 09/02/2024 2212       Radiology:  CT Angiogram Abdomen   Final Result      CT Angiogram Chest Pulmonary Embolism   Final Result       1. No acute pulmonary thromboembolus is seen.   2. The patient has an irregular appearance to the proximal celiac axis,   which raises the possibility of dissection. The celiac axis does remain   patent. The left gastric artery, splenic artery, and common hepatic   artery are all patent. There is a small outpouching which is seen   arising from the proximal celiac axis. 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 Vascular surgery consultation   is recommended.       Radiation dose reduction techniques were utilized, including automated   exposure control and exposure modulation based on body size.           This report was finalized on 9/2/2024 11:17 PM by Dr. Enriqueta Vivas M.D on Workstation: [id number]          CT Abdomen Pelvis With Contrast   Final Result       1. No acute pulmonary thromboembolus is seen.   2. The patient has an irregular  appearance to the proximal celiac axis,   which raises the possibility of dissection. The celiac axis does remain   patent. The left gastric artery, splenic artery, and common hepatic   artery are all patent. There is a small outpouching which is seen   arising from the proximal celiac axis. 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 Vascular surgery consultation   is recommended.       Radiation dose reduction techniques were utilized, including automated   exposure control and exposure modulation based on body size.           This report was finalized on 9/2/2024 11:17 PM by Dr. Enriqueta Vivas M.D on Workstation: [id number]          XR Chest 1 View   Final Result   No acute findings.       This report was finalized on 9/2/2024 10:38 PM by Dr. Enriqueta Vivas M.D on Workstation: [id number]              Assessment & Plan   Active Hospital Problems    Diagnosis     **Abnormal computed tomography angiography (CTA) of abdomen and pelvis     Anemia     Leukocytosis     Chest pain     Nausea & vomiting     Dyspnea     Abnormal EKG        Assessment and Plan:  Abnormal CTA abdomen and pelvis, vascular surgery consulted, repeat CTA performed, patient with normal variant of anatomy at the base of the celiac, no dissection or pseudoaneurysm.  Upper abdominal pain and burning with nausea and vomiting, will proceed with EGD in a.m. -patient has been cleared by cardiology and vascular surgery  Daily marijuana use  4.  Will continue PPI and sucralfate, n.p.o. at midnight        I discussed the patients findings and my recommendations with patient and nursing staff`.             Lidia PETERSON  [facility] [place]  [address] [place] [address]

## 2024-09-04 NOTE — PLAN OF CARE
"Goal Outcome Evaluation:           Progress: improving  Outcome Evaluation: VSS. Morphine order modified to 1mg q4h PRN and added Norco q6h PRN. Pt c/o 8-9/10 continuous abdominal pain and constant chest \"burning\" that radiates to neck. Pt is up ad vilma. Plan for EGD tomorrow.                               "

## 2024-09-04 NOTE — PROGRESS NOTES
I let him know the results of his echo were normal.  No further heart testing.  No need for follow-up unless something changes.    Moni Coelho MD

## 2024-09-04 NOTE — PROGRESS NOTES
Name: Anders Figueredo ADMIT: 2024   : 1995  PCP: Provider, No Known    MRN: [id number] LOS: 0 days   AGE/SEX: 28 y.o. male  ROOM: [facility]     Subjective   Subjective   Feeling better today. Pain improved. No N/V. Tolerating diet. No SOA. No diaphoresis. Voiding well.        Objective   Objective   Vital Signs  Temp:  [97.9 °F (36.6 °C)-99.1 °F (37.3 °C)] 98.4 °F (36.9 °C)  Heart Rate:  [71-83] 81  Resp:  [16-18] 18  BP: (120-134)/(71-91) 120/82  SpO2:  [95 %-98 %] 98 %  on   ;   Device (Oxygen Therapy): room air  Body mass index is 20.92 kg/m².  Physical Exam  Vitals and nursing note reviewed.   Constitutional:       General: He is not in acute distress.     Appearance: He is not ill-appearing, toxic-appearing or diaphoretic.   HENT:      Head: Normocephalic.      Nose: Nose normal.      Mouth/Throat:      Mouth: Mucous membranes are moist.      Pharynx: Oropharynx is clear.   Eyes:      General: No scleral icterus.        Right eye: No discharge.         Left eye: No discharge.      Extraocular Movements: Extraocular movements intact.      Conjunctiva/sclera: Conjunctivae normal.   Cardiovascular:      Rate and Rhythm: Normal rate and regular rhythm.      Pulses: Normal pulses.   Pulmonary:      Effort: Pulmonary effort is normal. No respiratory distress.      Breath sounds: Normal breath sounds. No wheezing or rales.   Chest:      Chest wall: Tenderness present.   Abdominal:      General: Bowel sounds are normal. There is no distension.      Palpations: Abdomen is soft.      Tenderness: There is abdominal tenderness (epigastric). There is no guarding or rebound.   Musculoskeletal:         General: No swelling or deformity. Normal range of motion.      Cervical back: Neck supple.   Skin:     General: Skin is warm and dry.      Capillary Refill: Capillary refill takes less than 2 seconds.      Coloration: Skin is not jaundiced.      Comments: Tattoos   Neurological:      General: No focal deficit  "present.      Mental Status: He is alert and oriented to person, place, and time. Mental status is at baseline.      Cranial Nerves: No cranial nerve deficit.      Coordination: Coordination normal.   Psychiatric:         Mood and Affect: Mood normal.         Behavior: Behavior normal.         Thought Content: Thought content normal.       Results Review     I reviewed the patient's new clinical results.  Results from last 7 days   Lab Units 09/04/24  0307 09/03/24  0420 09/02/24  2212   WBC 10*3/mm3 9.70 13.39* 14.05*   HEMOGLOBIN g/dL 12.1* 12.0* 13.9   PLATELETS 10*3/mm3 232 245 227     Results from last 7 days   Lab Units 09/04/24  0307 09/03/24  0420 09/02/24  2212   SODIUM mmol/L 137 135* 135*   POTASSIUM mmol/L 3.9 4.3 4.0   CHLORIDE mmol/L 100 101 97*   CO2 mmol/L 24.3 23.0 24.0   BUN mg/dL 12 16 18   CREATININE mg/dL 0.79 0.76 0.89   GLUCOSE mg/dL 80 79 86   EGFR mL/min/1.73 124.9 126.3 120.5     Results from last 7 days   Lab Units 09/04/24  0307 09/02/24  2212   ALBUMIN g/dL 3.4* 4.1   BILIRUBIN mg/dL 0.3 0.3   ALK PHOS U/L 91 121*   AST (SGOT) U/L 14 14   ALT (SGPT) U/L 12 14     Results from last 7 days   Lab Units 09/04/24  0307 09/03/24  0420 09/02/24  2212   CALCIUM mg/dL 8.8 8.8 9.5   ALBUMIN g/dL 3.4*  --  4.1   MAGNESIUM mg/dL 1.9  --   --    PHOSPHORUS mg/dL 2.3*  --   --        No results found for: \"HGBA1C\", \"POCGLU\"    CT Angiogram Chest Pulmonary Embolism    Result Date: 9/2/2024   1. No acute pulmonary thromboembolus is seen. 2. The patient has an irregular appearance to the proximal celiac axis, which raises the possibility of dissection. The celiac axis does remain patent. The left gastric artery, splenic artery, and common hepatic artery are all patent. There is a small outpouching which is seen arising from the proximal celiac axis. 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 Vascular surgery consultation is recommended.  Radiation dose reduction techniques " were utilized, including automated exposure control and exposure modulation based on body size.   This report was finalized on 9/2/2024 11:17 PM by Dr. Enriqueta Vivas M.D on Workstation: [id number]      CT Abdomen Pelvis With Contrast    Result Date: 9/2/2024   1. No acute pulmonary thromboembolus is seen. 2. The patient has an irregular appearance to the proximal celiac axis, which raises the possibility of dissection. The celiac axis does remain patent. The left gastric artery, splenic artery, and common hepatic artery are all patent. There is a small outpouching which is seen arising from the proximal celiac axis. 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 Vascular surgery consultation is recommended.  Radiation dose reduction techniques were utilized, including automated exposure control and exposure modulation based on body size.   This report was finalized on 9/2/2024 11:17 PM by Dr. Enriqueta Vivas M.D on Workstation: [id number]      XR Chest 1 View    Result Date: 9/2/2024  No acute findings.  This report was finalized on 9/2/2024 10:38 PM by Dr. Enriqueta Vivas M.D on Workstation: [id number]       I have personally reviewed all medications:  Scheduled Medications  pantoprazole, 40 mg, Oral, BID AC  sodium chloride, 10 mL, Intravenous, Q12H  sucralfate, 1 g, Oral, 4x Daily AC & at Bedtime    Infusions  sodium chloride, 75 mL/hr, Last Rate: 75 mL/hr (09/04/24 0517)    Diet  Diet: Regular/House; Fluid Consistency: Thin (IDDSI 0)    I have personally reviewed:  [x]  Laboratory   []  Microbiology   [x]  Radiology   [x]  EKG/Telemetry  [x]  Cardiology/Vascular   []  Pathology    []  Records       Assessment/Plan     Active Hospital Problems    Diagnosis  POA    **Abnormal computed tomography angiography (CTA) of abdomen and pelvis [R93.5]  Yes    Anemia [D64.9]  No    Leukocytosis [D72.829]  Yes    Chest pain [R07.9]  Yes    Nausea & vomiting [R11.2]  Yes    Dyspnea  [R06.00]  Yes    Abnormal EKG [R94.31]  Yes      Resolved Hospital Problems   No resolved problems to display.       28 y.o. male who presents with chest and upper abd pain and is admitted with abnormality of celiac axis on CTA as well as leukocytosis and abnl EKG.     Chest pain  Abnl celiac axis on CTA  N/V  Dyspnea  Appreciate Vasc Surg attention to pt  Repeated CTA with closer attention to celiac axis--await Vasc Surg recs today  ?Vasculitis/CTD in pt this young  Symptoms sound most c/w GERD/esophagitis with spasm, continue BID PPI and Carafate QAC and QHS  Abnl appearance to stomach on CT, have asked GI to consult     Abnl EKG--RBBB and LPFB  Report of some vague cardiac abnormality in past  Card has seen  Echo fine  No other w/u indicated per Card, suspect this EKG is his baseline  Presenting symptoms not related to any heart issue     Leukocytosis  Suspect reactive  Afebrile  Normalized w/o tx  Continue to monitor closely  Check blood cultures if any fever     Anemia NOS  Check iron panel  Hgb stable      SCDs for DVT prophylaxis.  Full code.  Discussed with patient.  Anticipate discharge home with family in 1-2 days.  Expected Discharge Date: 9/5/2024; Expected Discharge Time:       Blayne Meyers MD  [facility]  09/04/24  10:32 EDT

## 2024-09-05 ENCOUNTER — ANESTHESIA EVENT (OUTPATIENT)
Dept: GASTROENTEROLOGY | Facility: HOSPITAL | Age: 29
End: 2024-09-05
Payer: MEDICAID

## 2024-09-05 ENCOUNTER — ANESTHESIA (OUTPATIENT)
Dept: GASTROENTEROLOGY | Facility: HOSPITAL | Age: 29
End: 2024-09-05
Payer: MEDICAID

## 2024-09-05 PROBLEM — K25.0 ACUTE GASTRIC ULCER WITH HEMORRHAGE: Status: ACTIVE | Noted: 2024-09-05

## 2024-09-05 PROBLEM — D50.0 IRON DEFICIENCY ANEMIA DUE TO CHRONIC BLOOD LOSS: Status: ACTIVE | Noted: 2024-09-05

## 2024-09-05 LAB
ALBUMIN SERPL-MCNC: 3.4 G/DL (ref 3.5–5.2)
ALBUMIN/GLOB SERPL: 1 G/DL
ALP SERPL-CCNC: 89 U/L (ref 39–117)
ALT SERPL W P-5'-P-CCNC: 10 U/L (ref 1–41)
ANION GAP SERPL CALCULATED.3IONS-SCNC: 10.9 MMOL/L (ref 5–15)
AST SERPL-CCNC: 15 U/L (ref 1–40)
BILIRUB SERPL-MCNC: 0.2 MG/DL (ref 0–1.2)
BUN SERPL-MCNC: 12 MG/DL (ref 6–20)
BUN/CREAT SERPL: 14.5 (ref 7–25)
CALCIUM SPEC-SCNC: 8.9 MG/DL (ref 8.6–10.5)
CHLORIDE SERPL-SCNC: 104 MMOL/L (ref 98–107)
CO2 SERPL-SCNC: 27.1 MMOL/L (ref 22–29)
CREAT SERPL-MCNC: 0.83 MG/DL (ref 0.76–1.27)
DEPRECATED RDW RBC AUTO: 37.7 FL (ref 37–54)
EGFRCR SERPLBLD CKD-EPI 2021: 123 ML/MIN/1.73
ERYTHROCYTE [DISTWIDTH] IN BLOOD BY AUTOMATED COUNT: 12.5 % (ref 12.3–15.4)
FERRITIN SERPL-MCNC: 168 NG/ML (ref 30–400)
FOLATE SERPL-MCNC: 8.34 NG/ML (ref 4.78–24.2)
GLOBULIN UR ELPH-MCNC: 3.5 GM/DL
GLUCOSE SERPL-MCNC: 86 MG/DL (ref 65–99)
HCT VFR BLD AUTO: 35.8 % (ref 37.5–51)
HGB BLD-MCNC: 11.7 G/DL (ref 13–17.7)
IRON 24H UR-MRATE: 32 MCG/DL (ref 59–158)
IRON SATN MFR SERPL: 11 % (ref 20–50)
MAGNESIUM SERPL-MCNC: 2.1 MG/DL (ref 1.6–2.6)
MCH RBC QN AUTO: 26.7 PG (ref 26.6–33)
MCHC RBC AUTO-ENTMCNC: 32.7 G/DL (ref 31.5–35.7)
MCV RBC AUTO: 81.7 FL (ref 79–97)
PHOSPHATE SERPL-MCNC: 4.2 MG/DL (ref 2.5–4.5)
PLATELET # BLD AUTO: 289 10*3/MM3 (ref 140–450)
PMV BLD AUTO: 11.6 FL (ref 6–12)
POTASSIUM SERPL-SCNC: 4.3 MMOL/L (ref 3.5–5.2)
PROT SERPL-MCNC: 6.9 G/DL (ref 6–8.5)
RBC # BLD AUTO: 4.38 10*6/MM3 (ref 4.14–5.8)
SODIUM SERPL-SCNC: 142 MMOL/L (ref 136–145)
TIBC SERPL-MCNC: 295 MCG/DL (ref 298–536)
TRANSFERRIN SERPL-MCNC: 198 MG/DL (ref 200–360)
VIT B12 BLD-MCNC: 716 PG/ML (ref 211–946)
WBC NRBC COR # BLD AUTO: 7.97 10*3/MM3 (ref 3.4–10.8)

## 2024-09-05 PROCEDURE — 82746 ASSAY OF FOLIC ACID SERUM: CPT | Performed by: HOSPITALIST

## 2024-09-05 PROCEDURE — 25810000003 LACTATED RINGERS PER 1000 ML: Performed by: NURSE ANESTHETIST, CERTIFIED REGISTERED

## 2024-09-05 PROCEDURE — 25810000003 SODIUM CHLORIDE 0.9 % SOLUTION: Performed by: INTERNAL MEDICINE

## 2024-09-05 PROCEDURE — 25010000002 PROPOFOL 200 MG/20ML EMULSION: Performed by: NURSE ANESTHETIST, CERTIFIED REGISTERED

## 2024-09-05 PROCEDURE — 88305 TISSUE EXAM BY PATHOLOGIST: CPT | Performed by: INTERNAL MEDICINE

## 2024-09-05 PROCEDURE — 88312 SPECIAL STAINS GROUP 1: CPT | Performed by: INTERNAL MEDICINE

## 2024-09-05 PROCEDURE — G0378 HOSPITAL OBSERVATION PER HR: HCPCS

## 2024-09-05 PROCEDURE — 83735 ASSAY OF MAGNESIUM: CPT | Performed by: HOSPITALIST

## 2024-09-05 PROCEDURE — 82607 VITAMIN B-12: CPT | Performed by: HOSPITALIST

## 2024-09-05 PROCEDURE — C1052 HEMOSTATIC AGENT, GI, TOPIC: HCPCS | Performed by: INTERNAL MEDICINE

## 2024-09-05 PROCEDURE — 83540 ASSAY OF IRON: CPT | Performed by: HOSPITALIST

## 2024-09-05 PROCEDURE — 85027 COMPLETE CBC AUTOMATED: CPT | Performed by: HOSPITALIST

## 2024-09-05 PROCEDURE — 84466 ASSAY OF TRANSFERRIN: CPT | Performed by: HOSPITALIST

## 2024-09-05 PROCEDURE — 84100 ASSAY OF PHOSPHORUS: CPT | Performed by: HOSPITALIST

## 2024-09-05 PROCEDURE — 80053 COMPREHEN METABOLIC PANEL: CPT | Performed by: HOSPITALIST

## 2024-09-05 PROCEDURE — 88341 IMHCHEM/IMCYTCHM EA ADD ANTB: CPT | Performed by: INTERNAL MEDICINE

## 2024-09-05 PROCEDURE — 43239 EGD BIOPSY SINGLE/MULTIPLE: CPT | Performed by: INTERNAL MEDICINE

## 2024-09-05 PROCEDURE — 88342 IMHCHEM/IMCYTCHM 1ST ANTB: CPT | Performed by: INTERNAL MEDICINE

## 2024-09-05 PROCEDURE — 99214 OFFICE O/P EST MOD 30 MIN: CPT | Performed by: SURGERY

## 2024-09-05 PROCEDURE — 82728 ASSAY OF FERRITIN: CPT | Performed by: HOSPITALIST

## 2024-09-05 RX ORDER — SODIUM CHLORIDE, SODIUM LACTATE, POTASSIUM CHLORIDE, CALCIUM CHLORIDE 600; 310; 30; 20 MG/100ML; MG/100ML; MG/100ML; MG/100ML
INJECTION, SOLUTION INTRAVENOUS CONTINUOUS PRN
Status: DISCONTINUED | OUTPATIENT
Start: 2024-09-05 | End: 2024-09-05 | Stop reason: SURG

## 2024-09-05 RX ORDER — SODIUM CHLORIDE 9 MG/ML
30 INJECTION, SOLUTION INTRAVENOUS CONTINUOUS PRN
Status: DISCONTINUED | OUTPATIENT
Start: 2024-09-05 | End: 2024-09-06 | Stop reason: HOSPADM

## 2024-09-05 RX ORDER — LIDOCAINE HYDROCHLORIDE 20 MG/ML
INJECTION, SOLUTION EPIDURAL; INFILTRATION; INTRACAUDAL; PERINEURAL AS NEEDED
Status: DISCONTINUED | OUTPATIENT
Start: 2024-09-05 | End: 2024-09-05 | Stop reason: SURG

## 2024-09-05 RX ORDER — PROPOFOL 10 MG/ML
INJECTION, EMULSION INTRAVENOUS AS NEEDED
Status: DISCONTINUED | OUTPATIENT
Start: 2024-09-05 | End: 2024-09-05 | Stop reason: SURG

## 2024-09-05 RX ADMIN — SUCRALFATE 1 G: 1 TABLET ORAL at 16:33

## 2024-09-05 RX ADMIN — SUCRALFATE 1 G: 1 TABLET ORAL at 07:49

## 2024-09-05 RX ADMIN — SODIUM CHLORIDE, POTASSIUM CHLORIDE, SODIUM LACTATE AND CALCIUM CHLORIDE: 600; 310; 30; 20 INJECTION, SOLUTION INTRAVENOUS at 10:56

## 2024-09-05 RX ADMIN — PROPOFOL 250 MCG/KG/MIN: 10 INJECTION, EMULSION INTRAVENOUS at 11:01

## 2024-09-05 RX ADMIN — SUCRALFATE 1 G: 1 TABLET ORAL at 12:04

## 2024-09-05 RX ADMIN — SODIUM CHLORIDE 30 ML/HR: 9 INJECTION, SOLUTION INTRAVENOUS at 10:19

## 2024-09-05 RX ADMIN — PROPOFOL 20 MG: 10 INJECTION, EMULSION INTRAVENOUS at 11:00

## 2024-09-05 RX ADMIN — PROPOFOL 80 MG: 10 INJECTION, EMULSION INTRAVENOUS at 10:59

## 2024-09-05 RX ADMIN — PANTOPRAZOLE SODIUM 40 MG: 40 TABLET, DELAYED RELEASE ORAL at 07:49

## 2024-09-05 RX ADMIN — HYDROCODONE BITARTRATE AND ACETAMINOPHEN 1 TABLET: 5; 325 TABLET ORAL at 21:26

## 2024-09-05 RX ADMIN — Medication 10 ML: at 07:49

## 2024-09-05 RX ADMIN — LIDOCAINE HYDROCHLORIDE 100 MG: 20 INJECTION, SOLUTION EPIDURAL; INFILTRATION; INTRACAUDAL; PERINEURAL at 10:57

## 2024-09-05 RX ADMIN — PROPOFOL 100 MG: 10 INJECTION, EMULSION INTRAVENOUS at 10:58

## 2024-09-05 RX ADMIN — Medication 10 ML: at 21:26

## 2024-09-05 RX ADMIN — SUCRALFATE 1 G: 1 TABLET ORAL at 21:26

## 2024-09-05 RX ADMIN — PANTOPRAZOLE SODIUM 40 MG: 40 TABLET, DELAYED RELEASE ORAL at 16:33

## 2024-09-05 NOTE — PLAN OF CARE
Problem: Adult Inpatient Plan of Care  Goal: Plan of Care Review  Outcome: Ongoing, Progressing  Flowsheets (Taken 9/5/2024 1520)  Progress: improving  Plan of Care Reviewed With: patient  Outcome Evaluation: Vitals stable. EGD done today - Protonix and Carafate continued. No prn pain meds needed. Tolerating regular diet. Hopeful to discharge home tomorrow. Plan of care ongoing.

## 2024-09-05 NOTE — ANESTHESIA POSTPROCEDURE EVALUATION
Patient: Anders Figueredo    Procedure Summary       Date: 09/05/24 Room / Location: [facility] ENDOSCOPY 10 / [facility] ENDOSCOPY    Anesthesia Start: 1054 Anesthesia Stop: 1116    Procedure: ESOPHAGOGASTRODUODENOSCOPY with bx and endoclot (Esophagus) Diagnosis:       Epigastric pain      Nausea and vomiting, unspecified vomiting type      (Epigastric pain [R10.13])      (Nausea and vomiting, unspecified vomiting type [R11.2])    Surgeons: Tom Estes MD Provider: Jose Martin Torres MD    Anesthesia Type: MAC ASA Status: 2            Anesthesia Type: MAC    Vitals  Vitals Value Taken Time   BP 85/34 09/05/24 1118   Temp     Pulse 69 09/05/24 1123   Resp 12 09/05/24 1118   SpO2 98 % 09/05/24 1123   Vitals shown include unfiled device data.        Post Anesthesia Care and Evaluation    Patient location during evaluation: PACU  Patient participation: complete - patient participated  Level of consciousness: awake and alert  Pain management: adequate    Airway patency: patent  Anesthetic complications: No anesthetic complications    Cardiovascular status: acceptable  Respiratory status: acceptable  Hydration status: acceptable    Comments: --------------------            09/05/24               1118     --------------------   BP:     (!) 85/34    Pulse:      73       Resp:       12       Temp:                SpO2:      98%      --------------------

## 2024-09-05 NOTE — PLAN OF CARE
No new acute issues this shift, pt had one (1) complaint of chest and abdominal pain and heartburn this shift, successfully relieved by Norco and Carafate.  No acute episodes of nausea, vomiting, or diarrhea.  Pt NPO after midnight for EGD procedure in AM, consent on chart.  Pt resting well in bed, no further acute issues, will continue to monitor and observe.     Goal Outcome Evaluation:  Plan of Care Reviewed With: patient        Progress: no change      Problem: Adult Inpatient Plan of Care  Goal: Plan of Care Review  Flowsheets (Taken 9/5/2024 3451)  Progress: no change  Plan of Care Reviewed With: patient     Problem: Pain Acute  Goal: Acceptable Pain Control and Functional Ability  Outcome: Ongoing, Progressing

## 2024-09-05 NOTE — PROGRESS NOTES
Name: Anders Figueredo ADMIT: 2024   : 1995  PCP: Provider, No Known    MRN: [id number] LOS: 0 days   AGE/SEX: 28 y.o. male  ROOM: ENDO/ENDO     Subjective   Subjective   Seen in Endo Recovery, sedated  Hypotensive and hypoxic       Objective   Objective   Vital Signs  Temp:  [97.5 °F (36.4 °C)-99.3 °F (37.4 °C)] 98.4 °F (36.9 °C)  Heart Rate:  [] 67  Resp:  [10-18] 10  BP: ()/(34-85) 93/45  SpO2:  [94 %-100 %] 96 %  on  Flow (L/min):  [3] 3;   Device (Oxygen Therapy): room air  Body mass index is 20.92 kg/m².  Physical Exam  Vitals and nursing note reviewed.   Constitutional:       General: He is not in acute distress.     Appearance: He is not ill-appearing, toxic-appearing or diaphoretic.      Comments: Sedated   HENT:      Head: Normocephalic.      Nose: Nose normal.      Mouth/Throat:      Mouth: Mucous membranes are moist.      Pharynx: Oropharynx is clear.   Eyes:      General: No scleral icterus.        Right eye: No discharge.         Left eye: No discharge.      Extraocular Movements: Extraocular movements intact.      Conjunctiva/sclera: Conjunctivae normal.   Cardiovascular:      Rate and Rhythm: Normal rate and regular rhythm.      Pulses: Normal pulses.   Pulmonary:      Effort: Pulmonary effort is normal. No respiratory distress.      Breath sounds: Normal breath sounds. No wheezing or rales.   Abdominal:      General: Bowel sounds are normal. There is no distension.      Palpations: Abdomen is soft.   Musculoskeletal:         General: No swelling or deformity.      Cervical back: Neck supple.   Skin:     General: Skin is warm and dry.      Capillary Refill: Capillary refill takes less than 2 seconds.      Coloration: Skin is not jaundiced.      Comments: Tattoos       Results Review     I reviewed the patient's new clinical results.  Results from last 7 days   Lab Units 24  0320 24  0307 24  0420 24  2212   WBC 10*3/mm3 7.97 9.70 13.39* 14.05*  "  HEMOGLOBIN g/dL 11.7* 12.1* 12.0* 13.9   PLATELETS 10*3/mm3 289 232 245 227     Results from last 7 days   Lab Units 09/05/24 0320 09/04/24 0307 09/03/24 0420 09/02/24  2212   SODIUM mmol/L 142 137 135* 135*   POTASSIUM mmol/L 4.3 3.9 4.3 4.0   CHLORIDE mmol/L 104 100 101 97*   CO2 mmol/L 27.1 24.3 23.0 24.0   BUN mg/dL 12 12 16 18   CREATININE mg/dL 0.83 0.79 0.76 0.89   GLUCOSE mg/dL 86 80 79 86   EGFR mL/min/1.73 123.0 124.9 126.3 120.5     Results from last 7 days   Lab Units 09/05/24 0320 09/04/24 0307 09/02/24  2212   ALBUMIN g/dL 3.4* 3.4* 4.1   BILIRUBIN mg/dL 0.2 0.3 0.3   ALK PHOS U/L 89 91 121*   AST (SGOT) U/L 15 14 14   ALT (SGPT) U/L 10 12 14     Results from last 7 days   Lab Units 09/05/24 0320 09/04/24 0307 09/03/24 0420 09/02/24  2212   CALCIUM mg/dL 8.9 8.8 8.8 9.5   ALBUMIN g/dL 3.4* 3.4*  --  4.1   MAGNESIUM mg/dL 2.1 1.9  --   --    PHOSPHORUS mg/dL 4.2 2.3*  --   --        No results found for: \"HGBA1C\", \"POCGLU\"    No radiology results for the last day    I have personally reviewed all medications:  Scheduled Medications  pantoprazole, 40 mg, Oral, BID AC  sodium chloride, 10 mL, Intravenous, Q12H  sucralfate, 1 g, Oral, 4x Daily AC & at Bedtime    Infusions  sodium chloride, 30 mL/hr, Last Rate: 30 mL/hr (09/05/24 1019)    Diet  NPO Diet NPO Type: Strict NPO    I have personally reviewed:  [x]  Laboratory   []  Microbiology   [x]  Radiology   [x]  EKG/Telemetry  [x]  Cardiology/Vascular   []  Pathology    []  Records       Assessment/Plan     Active Hospital Problems    Diagnosis  POA    **Acute gastric ulcer with hemorrhage [K25.0]  Yes    Abnormal computed tomography angiography (CTA) of abdomen and pelvis [R93.5]  Yes    Anemia [D64.9]  No    Leukocytosis [D72.829]  Yes    Chest pain [R07.9]  Yes    Nausea & vomiting [R11.2]  Yes    Dyspnea [R06.00]  Yes    Abnormal EKG [R94.31]  Yes    Epigastric pain [R10.13]  Yes      Resolved Hospital Problems   No resolved problems to " display.       28 y.o. male who presents with chest and upper abd pain and is admitted with abnormality of celiac axis on CTA as well as leukocytosis and abnl EKG.     Chest pain  N/V  Bleeding gastric ulcer  Presenting symptoms sounded most c/w GERD/esophagitis with spasm  Abnl appearance to stomach on CT, GI consulted  EGD today revealed large bleeding gastric ulcer, treated with Endoclot, biopsies taken   Continue BID PPI and Carafate QAC and QHS  Repeat EGD in 8 weeks    Abnl celiac axis on CTA  Appreciate Vasc Surg attention to pt, they do not feel pt has any actual vascular abnormality  Recommend outpt f/u U/S in their office    Abnl EKG--RBBB and LPFB  Dyspnea  Report of some vague cardiac abnormality in past  Card has seen  Echo fine  No other w/u indicated per Card, suspect this EKG is his baseline  Presenting symptoms not related to any heart issue     Leukocytosis  Suspect reactive  Afebrile  Normalized w/o tx  Continue to monitor closely  Check blood cultures if any fever     Anemia, iron deficiency  Hgb fairly stable given bleeding gastric ulcer  Will continue to monitor overnight      SCDs for DVT prophylaxis.  Full code.  Discussed with Endo RN.  Anticipate discharge home with family tomorrow if hypotension and hypoxia improve, if symptoms improving, and if Hgb stable.  Expected Discharge Date: 9/6/2024; Expected Discharge Time:       Blayne Meyers MD  [facility]  09/05/24  11:52 EDT

## 2024-09-05 NOTE — PROGRESS NOTES
Name: Anders Figueredo ADMIT: 2024   : 1995  PCP: Provider, No Known    MRN: [id number] LOS: 0 days   AGE/SEX: 28 y.o. male  ROOM: [address] [place] [address]    Chief Complaint   Patient presents with    Chest Pain    Abdominal Pain     CC: Epigastric pain  Subjective     28 y.o. male with epigastric pain.  Initial CT question whether he could have a pseudoaneurysm and/or dissection at the celiac axis.  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  Basically this looks like a normal variant anatomy.  His PET pain today has resolved    Review of Systems pain has resolved    Objective     Scheduled Medications:   pantoprazole, 40 mg, Oral, BID AC  sodium chloride, 10 mL, Intravenous, Q12H  sucralfate, 1 g, Oral, 4x Daily AC & at Bedtime        Active Infusions:  sodium chloride, 30 mL/hr, Last Rate: Stopped (24 1222)        As Needed Medications:    acetaminophen **OR** acetaminophen **OR** acetaminophen    senna-docusate sodium **AND** polyethylene glycol **AND** bisacodyl **AND** bisacodyl    calcium carbonate    HYDROcodone-acetaminophen    Morphine    nitroglycerin    ondansetron ODT **OR** ondansetron    sodium chloride    sodium chloride    sodium chloride    sodium chloride    Vital Signs  Vital Signs Patient Vitals for the past 24 hrs:   BP Temp Temp src Pulse Resp SpO2   24 1300 106/94 97.9 °F (36.6 °C) Oral 65 18 99 %   24 1202 94/56 97.5 °F (36.4 °C) Oral 66 14 97 %   24 1142 93/45 -- -- 67 10 96 %   24 1128 (!) 88/39 -- -- 68 16 98 %   24 1118 (!) 85/34 -- -- 73 12 98 %   24 0700 -- -- -- 67 -- 97 %   24 0500 122/82 98.4 °F (36.9 °C) Oral 82 18 94 %   24 2250 131/79 99.3 °F (37.4  "°C) Oral 112 18 98 %   09/04/24 1905 116/75 98.4 °F (36.9 °C) Oral 82 18 99 %     Vital Signs (range)  Temp:  [97.5 °F (36.4 °C)-99.3 °F (37.4 °C)] 97.9 °F (36.6 °C)  Heart Rate:  [] 65  Resp:  [10-18] 18  BP: ()/(34-94) 106/94  I/O:  I/O last 3 completed shifts:  In: 1017.5 [I.V.:1017.5]  Out: -   I/O:   Intake/Output Summary (Last 24 hours) at 9/5/2024 1402  Last data filed at 9/5/2024 1300  Gross per 24 hour   Intake 800 ml   Output --   Net 800 ml     BMI:  Body mass index is 20.92 kg/m².    Physical Exam:  Physical Exam   Epigastric area tender without rebound.  Abdomen flat  Results Review:     CBC    Results from last 7 days   Lab Units 09/05/24 0320 09/04/24  0307 09/03/24  0420 09/02/24  2212   WBC 10*3/mm3 7.97 9.70 13.39* 14.05*   HEMOGLOBIN g/dL 11.7* 12.1* 12.0* 13.9   PLATELETS 10*3/mm3 289 232 245 227     BMP   Results from last 7 days   Lab Units 09/05/24 0320 09/04/24  0307 09/03/24  0420 09/02/24  2212   SODIUM mmol/L 142 137 135* 135*   POTASSIUM mmol/L 4.3 3.9 4.3 4.0   CHLORIDE mmol/L 104 100 101 97*   CO2 mmol/L 27.1 24.3 23.0 24.0   BUN mg/dL 12 12 16 18   CREATININE mg/dL 0.83 0.79 0.76 0.89   GLUCOSE mg/dL 86 80 79 86   MAGNESIUM mg/dL 2.1 1.9  --   --    PHOSPHORUS mg/dL 4.2 2.3*  --   --      Cr Clearance Estimated Creatinine Clearance: 128.6 mL/min (by C-G formula based on SCr of 0.83 mg/dL).  Coag     HbA1C No results found for: \"HGBA1C\"  Blood Glucose No results found for: \"POCGLU\"  Infection     CMP   Results from last 7 days   Lab Units 09/05/24  0320 09/04/24  0307 09/03/24  0420 09/02/24  2212   SODIUM mmol/L 142 137 135* 135*   POTASSIUM mmol/L 4.3 3.9 4.3 4.0   CHLORIDE mmol/L 104 100 101 97*   CO2 mmol/L 27.1 24.3 23.0 24.0   BUN mg/dL 12 12 16 18   CREATININE mg/dL 0.83 0.79 0.76 0.89   GLUCOSE mg/dL 86 80 79 86   ALBUMIN g/dL 3.4* 3.4*  --  4.1   BILIRUBIN mg/dL 0.2 0.3  --  0.3   ALK PHOS U/L 89 91  --  121*   AST (SGOT) U/L 15 14  --  14   ALT (SGPT) U/L 10 12  " --  14   LIPASE U/L  --  13  --  10*     ABG      Radiology(recent) No radiology results for the last day    Assessment & Plan     Assessment & Plan      Acute gastric ulcer with hemorrhage    Abnormal computed tomography angiography (CTA) of abdomen and pelvis    Leukocytosis    Chest pain    Nausea & vomiting    Dyspnea    Abnormal EKG    Epigastric pain    Iron deficiency anemia due to chronic blood loss      28 y.o. 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  Endoscopy planned for this morning.  Will plan follow-up ultrasound in the office in 4 to 6 months to look at this area at the celiac axis.  He is aware to call if things get worse again.    Noel Mcdowell MD  09/05/24  14:02 EDT    Please call my office with any question: [phone]    Active Hospital Problems    Diagnosis  POA    **Acute gastric ulcer with hemorrhage [K25.0]  Yes    Iron deficiency anemia due to chronic blood loss [D50.0]  Yes    Abnormal computed tomography angiography (CTA) of abdomen and pelvis [R93.5]  Yes    Leukocytosis [D72.829]  Yes    Chest pain [R07.9]  Yes    Nausea & vomiting [R11.2]  Yes    Dyspnea [R06.00]  Yes    Abnormal EKG [R94.31]  Yes    Epigastric pain [R10.13]  Yes      Resolved Hospital Problems   No resolved problems to display.

## 2024-09-05 NOTE — ANESTHESIA PREPROCEDURE EVALUATION
Anesthesia Evaluation     Patient summary reviewed and Nursing notes reviewed                Airway   Mallampati: II  Dental      Pulmonary    (+) ,shortness of breath  Cardiovascular     ECG reviewed  Rhythm: regular  Rate: normal    (+) dysrhythmias (RBB & LPFB & short SD interval) PAC      Neuro/Psych- negative ROS  GI/Hepatic/Renal/Endo - negative ROS     Musculoskeletal (-) negative ROS    Abdominal    Substance History - negative use     OB/GYN negative ob/gyn ROS         Other                      Anesthesia Plan    ASA 2     MAC     intravenous induction     Anesthetic plan, risks, benefits, and alternatives have been provided, discussed and informed consent has been obtained with: patient.    CODE STATUS:    Code Status (Patient has no pulse and is not breathing): CPR (Attempt to Resuscitate)  Medical Interventions (Patient has pulse or is breathing): Full Support

## 2024-09-06 VITALS
TEMPERATURE: 98.1 F | OXYGEN SATURATION: 99 % | DIASTOLIC BLOOD PRESSURE: 81 MMHG | HEIGHT: 71 IN | WEIGHT: 150 LBS | BODY MASS INDEX: 21 KG/M2 | SYSTOLIC BLOOD PRESSURE: 117 MMHG | RESPIRATION RATE: 18 BRPM | HEART RATE: 62 BPM

## 2024-09-06 LAB
ALBUMIN SERPL-MCNC: 3.4 G/DL (ref 3.5–5.2)
ALBUMIN/GLOB SERPL: 1 G/DL
ALP SERPL-CCNC: 87 U/L (ref 39–117)
ALT SERPL W P-5'-P-CCNC: 9 U/L (ref 1–41)
ANION GAP SERPL CALCULATED.3IONS-SCNC: 6 MMOL/L (ref 5–15)
AST SERPL-CCNC: 14 U/L (ref 1–40)
BILIRUB SERPL-MCNC: 0.2 MG/DL (ref 0–1.2)
BUN SERPL-MCNC: 12 MG/DL (ref 6–20)
BUN/CREAT SERPL: 15.2 (ref 7–25)
CALCIUM SPEC-SCNC: 8.5 MG/DL (ref 8.6–10.5)
CHLORIDE SERPL-SCNC: 103 MMOL/L (ref 98–107)
CO2 SERPL-SCNC: 29 MMOL/L (ref 22–29)
CREAT SERPL-MCNC: 0.79 MG/DL (ref 0.76–1.27)
CYTO UR: NORMAL
DEPRECATED RDW RBC AUTO: 38 FL (ref 37–54)
DX PRELIMINARY: NORMAL
EGFRCR SERPLBLD CKD-EPI 2021: 124.9 ML/MIN/1.73
ERYTHROCYTE [DISTWIDTH] IN BLOOD BY AUTOMATED COUNT: 12.7 % (ref 12.3–15.4)
GLOBULIN UR ELPH-MCNC: 3.4 GM/DL
GLUCOSE SERPL-MCNC: 78 MG/DL (ref 65–99)
HCT VFR BLD AUTO: 35.9 % (ref 37.5–51)
HGB BLD-MCNC: 11.9 G/DL (ref 13–17.7)
LAB AP CASE REPORT: NORMAL
LAB AP DIAGNOSIS COMMENT: NORMAL
LAB AP SPECIAL STAINS: NORMAL
MAGNESIUM SERPL-MCNC: 1.9 MG/DL (ref 1.6–2.6)
MCH RBC QN AUTO: 27.3 PG (ref 26.6–33)
MCHC RBC AUTO-ENTMCNC: 33.1 G/DL (ref 31.5–35.7)
MCV RBC AUTO: 82.3 FL (ref 79–97)
PATH REPORT.FINAL DX SPEC: NORMAL
PATH REPORT.GROSS SPEC: NORMAL
PHOSPHATE SERPL-MCNC: 3 MG/DL (ref 2.5–4.5)
PLATELET # BLD AUTO: 282 10*3/MM3 (ref 140–450)
PMV BLD AUTO: 11.1 FL (ref 6–12)
POTASSIUM SERPL-SCNC: 3.4 MMOL/L (ref 3.5–5.2)
PROT SERPL-MCNC: 6.8 G/DL (ref 6–8.5)
RBC # BLD AUTO: 4.36 10*6/MM3 (ref 4.14–5.8)
SODIUM SERPL-SCNC: 138 MMOL/L (ref 136–145)
WBC NRBC COR # BLD AUTO: 6.98 10*3/MM3 (ref 3.4–10.8)

## 2024-09-06 PROCEDURE — 80053 COMPREHEN METABOLIC PANEL: CPT | Performed by: HOSPITALIST

## 2024-09-06 PROCEDURE — 85027 COMPLETE CBC AUTOMATED: CPT | Performed by: HOSPITALIST

## 2024-09-06 PROCEDURE — 83735 ASSAY OF MAGNESIUM: CPT | Performed by: HOSPITALIST

## 2024-09-06 PROCEDURE — 99214 OFFICE O/P EST MOD 30 MIN: CPT | Performed by: SURGERY

## 2024-09-06 PROCEDURE — G0378 HOSPITAL OBSERVATION PER HR: HCPCS

## 2024-09-06 PROCEDURE — 84100 ASSAY OF PHOSPHORUS: CPT | Performed by: HOSPITALIST

## 2024-09-06 PROCEDURE — 99214 OFFICE O/P EST MOD 30 MIN: CPT

## 2024-09-06 RX ORDER — SUCRALFATE 1 G/1
1 TABLET ORAL
Qty: 120 TABLET | Refills: 1 | Status: SHIPPED | OUTPATIENT
Start: 2024-09-06

## 2024-09-06 RX ORDER — PANTOPRAZOLE SODIUM 40 MG/1
40 TABLET, DELAYED RELEASE ORAL
Qty: 60 TABLET | Refills: 1 | Status: SHIPPED | OUTPATIENT
Start: 2024-09-06

## 2024-09-06 RX ORDER — POTASSIUM CHLORIDE 750 MG/1
40 TABLET, FILM COATED, EXTENDED RELEASE ORAL EVERY 4 HOURS
Status: COMPLETED | OUTPATIENT
Start: 2024-09-06 | End: 2024-09-06

## 2024-09-06 RX ADMIN — SUCRALFATE 1 G: 1 TABLET ORAL at 08:11

## 2024-09-06 RX ADMIN — PANTOPRAZOLE SODIUM 40 MG: 40 TABLET, DELAYED RELEASE ORAL at 08:10

## 2024-09-06 RX ADMIN — POTASSIUM CHLORIDE 40 MEQ: 750 TABLET, EXTENDED RELEASE ORAL at 12:11

## 2024-09-06 RX ADMIN — HYDROCODONE BITARTRATE AND ACETAMINOPHEN 1 TABLET: 5; 325 TABLET ORAL at 03:07

## 2024-09-06 RX ADMIN — HYDROCODONE BITARTRATE AND ACETAMINOPHEN 1 TABLET: 5; 325 TABLET ORAL at 11:44

## 2024-09-06 RX ADMIN — SUCRALFATE 1 G: 1 TABLET ORAL at 11:44

## 2024-09-06 RX ADMIN — ACETAMINOPHEN 325MG 650 MG: 325 TABLET ORAL at 08:10

## 2024-09-06 RX ADMIN — POTASSIUM CHLORIDE 40 MEQ: 750 TABLET, EXTENDED RELEASE ORAL at 08:10

## 2024-09-06 RX ADMIN — Medication 10 ML: at 08:11

## 2024-09-06 NOTE — PROGRESS NOTES
Name: Anders Figueredo ADMIT: 2024   : 1995  PCP: Provider, No Known    MRN: [id number] LOS: 0 days   AGE/SEX: 28 y.o. male  ROOM: [address]    Chief Complaint   Patient presents with    Chest Pain    Abdominal Pain     CC: Epigastric pain  Subjective     28 y.o. male with epigastric pain.  Initial CT question whether he could have a pseudoaneurysm and/or dissection at the celiac axis.  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  Basically this looks like a normal variant anatomy.  Pain is resolved and his upper GI endoscopy looks to show ulceration probably from cannabis use.    Review of Systems pain has resolved    Objective     Scheduled Medications:   pantoprazole, 40 mg, Oral, BID AC  potassium chloride ER, 40 mEq, Oral, Q4H  sodium chloride, 10 mL, Intravenous, Q12H  sucralfate, 1 g, Oral, 4x Daily AC & at Bedtime        Active Infusions:  sodium chloride, 30 mL/hr, Last Rate: Stopped (24 1222)        As Needed Medications:    acetaminophen **OR** acetaminophen **OR** acetaminophen    senna-docusate sodium **AND** polyethylene glycol **AND** bisacodyl **AND** bisacodyl    calcium carbonate    HYDROcodone-acetaminophen    Morphine    nitroglycerin    ondansetron ODT **OR** ondansetron    Potassium Replacement - Follow Nurse / BPA Driven Protocol    sodium chloride    sodium chloride    sodium chloride    sodium chloride    Vital Signs  Vital Signs Patient Vitals for the past 24 hrs:   BP Temp Temp src Pulse Resp SpO2   24 0827 117/81 98.1 °F (36.7 °C) Oral 62 18 99 %   24 2330 111/75 99 °F (37.2 °C) Oral 69 18 97 %   24 1910 117/74 98.4 °F (36.9 °C) Oral 73 18 99 %   24 1300 106/94 97.9 °F  "(36.6 °C) Oral 65 18 99 %   09/05/24 1202 94/56 97.5 °F (36.4 °C) Oral 66 14 97 %   09/05/24 1142 93/45 -- -- 67 10 96 %     Vital Signs (range)  Temp:  [97.5 °F (36.4 °C)-99 °F (37.2 °C)] 98.1 °F (36.7 °C)  Heart Rate:  [62-73] 62  Resp:  [10-18] 18  BP: ()/(45-94) 117/81  I/O:  I/O last 3 completed shifts:  In: 1280 [P.O.:780; I.V.:500]  Out: -   I/O:   Intake/Output Summary (Last 24 hours) at 9/6/2024 1140  Last data filed at 9/6/2024 0500  Gross per 24 hour   Intake 780 ml   Output --   Net 780 ml     BMI:  Body mass index is 20.92 kg/m².    Physical Exam:  Physical Exam   Abdomen benign.  Abdomen flat  Results Review:     CBC    Results from last 7 days   Lab Units 09/06/24  0334 09/05/24  0320 09/04/24  0307 09/03/24  0420 09/02/24  2212   WBC 10*3/mm3 6.98 7.97 9.70 13.39* 14.05*   HEMOGLOBIN g/dL 11.9* 11.7* 12.1* 12.0* 13.9   PLATELETS 10*3/mm3 282 289 232 245 227     BMP   Results from last 7 days   Lab Units 09/06/24  0334 09/05/24  0320 09/04/24  0307 09/03/24  0420 09/02/24  2212   SODIUM mmol/L 138 142 137 135* 135*   POTASSIUM mmol/L 3.4* 4.3 3.9 4.3 4.0   CHLORIDE mmol/L 103 104 100 101 97*   CO2 mmol/L 29.0 27.1 24.3 23.0 24.0   BUN mg/dL 12 12 12 16 18   CREATININE mg/dL 0.79 0.83 0.79 0.76 0.89   GLUCOSE mg/dL 78 86 80 79 86   MAGNESIUM mg/dL 1.9 2.1 1.9  --   --    PHOSPHORUS mg/dL 3.0 4.2 2.3*  --   --      Cr Clearance Estimated Creatinine Clearance: 135.1 mL/min (by C-G formula based on SCr of 0.79 mg/dL).  Coag     HbA1C No results found for: \"HGBA1C\"  Blood Glucose No results found for: \"POCGLU\"  Infection     CMP   Results from last 7 days   Lab Units 09/06/24  0334 09/05/24  0320 09/04/24  0307 09/03/24  0420 09/02/24  2212   SODIUM mmol/L 138 142 137 135* 135*   POTASSIUM mmol/L 3.4* 4.3 3.9 4.3 4.0   CHLORIDE mmol/L 103 104 100 101 97*   CO2 mmol/L 29.0 27.1 24.3 23.0 24.0   BUN mg/dL 12 12 12 16 18   CREATININE mg/dL 0.79 0.83 0.79 0.76 0.89   GLUCOSE mg/dL 78 86 80 79 86 "   ALBUMIN g/dL 3.4* 3.4* 3.4*  --  4.1   BILIRUBIN mg/dL 0.2 0.2 0.3  --  0.3   ALK PHOS U/L 87 89 91  --  121*   AST (SGOT) U/L 14 15 14  --  14   ALT (SGPT) U/L 9 10 12  --  14   LIPASE U/L  --   --  13  --  10*     ABG      Radiology(recent) No radiology results for the last day    Assessment & Plan     Assessment & Plan      Acute gastric ulcer with hemorrhage    Abnormal computed tomography angiography (CTA) of abdomen and pelvis    Leukocytosis    Chest pain    Nausea & vomiting    Dyspnea    Abnormal EKG    Epigastric pain    Iron deficiency anemia due to chronic blood loss      28 y.o. 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  Endoscopy showed significant ulceration in this very young man.  Suspicion that the cannabis use is part of it.  We will follow-up on his celiac artery with ultrasound in 4 to 6 months.  Our office will contact him with appointment.  We will sign off.  Cleared for discharge from our side    Noel Mcdowell MD  09/06/24  11:40 EDT    Please call my office with any question: [phone]    Active Hospital Problems    Diagnosis  POA    **Acute gastric ulcer with hemorrhage [K25.0]  Yes    Iron deficiency anemia due to chronic blood loss [D50.0]  Yes    Abnormal computed tomography angiography (CTA) of abdomen and pelvis [R93.5]  Yes    Leukocytosis [D72.829]  Yes    Chest pain [R07.9]  Yes    Nausea & vomiting [R11.2]  Yes    Dyspnea [R06.00]  Yes    Abnormal EKG [R94.31]  Yes    Epigastric pain [R10.13]  Yes      Resolved Hospital Problems   No resolved problems to display.

## 2024-09-06 NOTE — DISCHARGE SUMMARY
Patient Name: Anders Figueredo  : 1995  MRN: [id number]    Date of Admission: 2024  Date of Discharge:  2024  Primary Care Physician: Provider, No Known      Chief Complaint:   Chest Pain and Abdominal Pain      Discharge Diagnoses     Active Hospital Problems    Diagnosis  POA    **Acute gastric ulcer with hemorrhage [K25.0]  Yes    Iron deficiency anemia due to chronic blood loss [D50.0]  Yes    Abnormal computed tomography angiography (CTA) of abdomen and pelvis [R93.5]  Yes    Leukocytosis [D72.829]  Yes    Chest pain [R07.9]  Yes    Nausea & vomiting [R11.2]  Yes    Dyspnea [R06.00]  Yes    Abnormal EKG [R94.31]  Yes    Epigastric pain [R10.13]  Yes      Resolved Hospital Problems   No resolved problems to display.        Hospital Course     Very pleasant 28 y.o. male who presented with chest and upper abd pain and was admitted with abnormality of celiac axis on CTA as well as leukocytosis and abnl EKG. 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 Jeremias Villegas EKG--RBBB and LPFB  Dyspnea  Report of some vague cardiac abnormality in past  Card has seen here  Echo fine  No other w/u indicated per Card, suspect this EKG is his baseline  Presenting symptoms not related to any heart issue     Leukocytosis  Suspect reactive  Afebrile  Normalized w/o tx     Iron deficiency anemia due to  chronic blood loss from gastric ulcer  Hgb stable.acceptable here        SCDs for DVT prophylaxis while here.  Full code confirmed.  Discussed with pt and Dr. Mcdowell.  Discharge home with family today     Day of Discharge     Subjective:  Feeling much better today. No pain or nausea. Tolerating diet. No SOA or palp. Voiding well. No F/C/NS. Eager to go home.    Physical Exam:  Temp:  [97.5 °F (36.4 °C)-99 °F (37.2 °C)] 98.1 °F (36.7 °C)  Heart Rate:  [62-73] 62  Resp:  [10-18] 18  BP: ()/(45-94) 117/81  Body mass index is 20.92 kg/m².  Physical Exam  Vitals and nursing note reviewed. Exam conducted with a chaperone present (Dr. Mcdowell).   Constitutional:       General: He is not in acute distress.     Appearance: He is not ill-appearing, toxic-appearing or diaphoretic.   HENT:      Head: Normocephalic.      Nose: Nose normal.      Mouth/Throat:      Mouth: Mucous membranes are moist.      Pharynx: Oropharynx is clear.   Eyes:      General: No scleral icterus.        Right eye: No discharge.         Left eye: No discharge.      Conjunctiva/sclera: Conjunctivae normal.   Cardiovascular:      Rate and Rhythm: Normal rate and regular rhythm.      Pulses: Normal pulses.   Pulmonary:      Effort: Pulmonary effort is normal. No respiratory distress.      Breath sounds: Normal breath sounds. No wheezing or rales.   Abdominal:      General: Bowel sounds are normal. There is no distension.      Palpations: Abdomen is soft.      Tenderness: There is abdominal tenderness (Mild). There is no guarding or rebound.   Musculoskeletal:         General: No swelling or deformity. Normal range of motion.      Cervical back: Neck supple.   Skin:     General: Skin is warm and dry.      Capillary Refill: Capillary refill takes less than 2 seconds.      Coloration: Skin is not jaundiced.      Comments: Tattoos   Neurological:      General: No focal deficit present.      Mental Status: He is alert and oriented to person, place, and time.  Mental status is at baseline.      Cranial Nerves: No cranial nerve deficit.      Coordination: Coordination normal.   Psychiatric:         Mood and Affect: Mood normal.         Behavior: Behavior normal.         Thought Content: Thought content normal.         Consultants     Consult Orders (all) (From admission, onward)       Start     Ordered    09/04/24 1016  Inpatient Gastroenterology Consult  Once        Specialty:  Gastroenterology  Provider:  Tom Estes MD    09/04/24 1016    09/03/24 0752  Inpatient Cardiology Consult  Once        Specialty:  Cardiology  Provider:  Herminio Seymour MD    09/03/24 0752    09/03/24 0147  Inpatient Vascular Surgery Consult  Once        Specialty:  Vascular Surgery  Provider:  Noel Mcdowell MD    09/03/24 0148    09/03/24 0056  [facility] (on-call MD unless specified) Details  Once        Specialty:  Hospitalist  Provider:  (Not yet assigned)    09/03/24 0055    09/03/24 0019  Vascular Surgery Consult  Once        Specialty:  Vascular Surgery  Provider:  Noel Mcdowell MD    09/03/24 0019                  Procedures     ESOPHAGOGASTRODUODENOSCOPY with bx and endoclot    Imaging Results (All)       Procedure Component Value Units Date/Time    CT Angiogram Abdomen [[id number]] Collected: 09/03/24 1546     Updated: 09/04/24 0845    Narrative:      CT ANGIOGRAM OF THE ABDOMEN MULTIPLE CORONAL, SAGITTAL, AND 3D  RECONSTRUCTIONS     HISTORY: 28-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     TECHNIQUE: Radiation dose reduction techniques were utilized, including  automated exposure control and exposure modulation based on body size.   CT angiogram of the abdomen was performed following the administration  of IV contrast. Multiple coronal, sagittal, and 3D reconstruction images  were obtained. Compared with chest CTA 09/02/2024.     FINDINGS:  1. There is breathing artifact at the upper abdomen resulting in  blurring of the celiac artery and branches. The described  abnormality  arising from the celiac artery is actually better seen on the previous  chest CTA than on the current examination. The abnormality may represent  a saccular aneurysm arising from the left wall of the celiac trunk, but  the possibility of a variant branch with a dilated origin is considered  as well. The abnormality is best seen on the coronal series of the chest  CTA, series 4 image 56. There is no evidence for dissection.     Celiac trunk, splenic artery, proper hepatic artery, and left gastric  artery are patent. SMA and ORLIN are patent. Renal arteries are patent.  The contour of these arteries is normal.     2. The stomach appears abnormal. The gastric cardia and fundus are  within normal limits, but most of the gastric body appears significantly  thickened and edematous. Similar appearance on the previous examination.  This can be seen on coronal series 5 image 32. GI follow-up with  endoscopy is recommended.      This report was finalized on 9/4/2024 8:42 AM by Dr. Ximena Hartmann M.D on  Workstation: [id number]       CT Angiogram Chest Pulmonary Embolism [[id number]] Collected: 09/02/24 2306     Updated: 09/02/24 2320    Narrative:      CT ANGIOGRAM OF THE CHEST; CT OF THE ABDOMEN AND PELVIS     HISTORY: Shortness of air. Tachycardia. Epigastric pain.     COMPARISON: None available.     TECHNIQUE: Axial CT imaging was obtained through the thorax. IV contrast  was administered. Three-D reformatted images were obtained. Axial CT  imaging was obtained through the abdomen and pelvis. IV contrast was  administered.     FINDINGS:  CT OF THE CHEST: No acute pulmonary thromboembolus is seen. Thoracic  aorta is normal in caliber. There is no evidence of dissection of the  aorta. However, there is an irregular appearance to the proximal celiac  axis, which may reflect a dissection. There is an outpouching arising  from the proximal celiac axis, which truncates rather abruptly. I'm  uncertain of its etiology. 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 This measures up to 7 x 5 mm. Superior mesenteric artery is  widely patent. The thyroid gland, trachea, and esophagus appear  unremarkable. There are no coronary artery calcifications. Patient does  appear to have some minimal biapical scarring. No acute infiltrates are  seen. Mediastinal lymph nodes do not appear pathologically enlarged. No  acute osseous abnormalities are seen..     CT OF THE ABDOMEN PELVIS: No suspicious hepatic lesions are seen. The  stomach, duodenum, adrenal glands, spleen, pancreas, and gallbladder are  normal. The kidneys enhance symmetrically. No hydronephrosis is seen.  Urinary bladder and prostate gland are within normal limits. There is no  bowel obstruction. There is no evidence of appendicitis. No acute  osseous abnormalities are seen.       Impression:         1. No acute pulmonary thromboembolus is seen.  2. The patient has an irregular appearance to the proximal celiac axis,  which raises the possibility of dissection. The celiac axis does remain  patent. The left gastric artery, splenic artery, and common hepatic  artery are all patent. There is a small outpouching which is seen  arising from the proximal celiac axis. It truncates abruptly, and may  represent a small saccular pseudoaneurysm. Vascular surgery consultation  is recommended.     Radiation dose reduction techniques were utilized, including automated  exposure control and exposure modulation based on body size.        This report was finalized on 9/2/2024 11:17 PM by Dr. Enriqueta Vivas M.D on Workstation: [id number]       CT Abdomen Pelvis With Contrast [[id number]] Collected: 09/02/24 2306     Updated: 09/02/24 2320    Narrative:      CT ANGIOGRAM OF THE CHEST; CT OF THE ABDOMEN AND PELVIS     HISTORY: Shortness of air. Tachycardia. Epigastric pain.     COMPARISON: None available.     TECHNIQUE: Axial CT imaging was obtained through the thorax. IV contrast  was  administered. Three-D reformatted images were obtained. Axial CT  imaging was obtained through the abdomen and pelvis. IV contrast was  administered.     FINDINGS:  CT OF THE CHEST: No acute pulmonary thromboembolus is seen. Thoracic  aorta is normal in caliber. There is no evidence of dissection of the  aorta. However, there is an irregular appearance to the proximal celiac  axis, which may reflect a dissection. There is an outpouching arising  from the proximal celiac axis, which truncates rather abruptly. I'm  uncertain of its etiology. 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 This measures up to 7 x 5 mm. Superior mesenteric artery is  widely patent. The thyroid gland, trachea, and esophagus appear  unremarkable. There are no coronary artery calcifications. Patient does  appear to have some minimal biapical scarring. No acute infiltrates are  seen. Mediastinal lymph nodes do not appear pathologically enlarged. No  acute osseous abnormalities are seen..     CT OF THE ABDOMEN PELVIS: No suspicious hepatic lesions are seen. The  stomach, duodenum, adrenal glands, spleen, pancreas, and gallbladder are  normal. The kidneys enhance symmetrically. No hydronephrosis is seen.  Urinary bladder and prostate gland are within normal limits. There is no  bowel obstruction. There is no evidence of appendicitis. No acute  osseous abnormalities are seen.       Impression:         1. No acute pulmonary thromboembolus is seen.  2. The patient has an irregular appearance to the proximal celiac axis,  which raises the possibility of dissection. The celiac axis does remain  patent. The left gastric artery, splenic artery, and common hepatic  artery are all patent. There is a small outpouching which is seen  arising from the proximal celiac axis. It truncates abruptly, and may  represent a small saccular pseudoaneurysm. Vascular surgery consultation  is recommended.     Radiation dose reduction techniques were utilized,  including automated  exposure control and exposure modulation based on body size.        This report was finalized on 9/2/2024 11:17 PM by Dr. Enriqueta Vivas M.D on Workstation: [id number]       XR Chest 1 View [[id number]] Collected: 09/02/24 2237     Updated: 09/02/24 2241    Narrative:      SINGLE VIEW OF THE CHEST     HISTORY: Chest and abdominal pain     COMPARISON: None available.     FINDINGS:  Heart size is within normal limits. No pneumothorax, pleural effusion,  or acute infiltrate is seen.       Impression:      No acute findings.     This report was finalized on 9/2/2024 10:38 PM by Dr. Enriqueta Vivas M.D on Workstation: [id number]               Results for orders placed during the hospital encounter of 09/02/24    Adult Transthoracic Echo Complete W/ Cont if Necessary Per Protocol    Interpretation Summary    Left ventricular systolic function is normal. Calculated left ventricular EF = 65.4%    Left ventricular diastolic function was normal.    Estimated right ventricular systolic pressure from tricuspid regurgitation is normal (<35 mmHg).    Pertinent Labs     Results from last 7 days   Lab Units 09/06/24  0334 09/05/24  0320 09/04/24  0307 09/03/24  0420   WBC 10*3/mm3 6.98 7.97 9.70 13.39*   HEMOGLOBIN g/dL 11.9* 11.7* 12.1* 12.0*   PLATELETS 10*3/mm3 282 289 232 245     Results from last 7 days   Lab Units 09/06/24  0334 09/05/24  0320 09/04/24  0307 09/03/24  0420   SODIUM mmol/L 138 142 137 135*   POTASSIUM mmol/L 3.4* 4.3 3.9 4.3   CHLORIDE mmol/L 103 104 100 101   CO2 mmol/L 29.0 27.1 24.3 23.0   BUN mg/dL 12 12 12 16   CREATININE mg/dL 0.79 0.83 0.79 0.76   GLUCOSE mg/dL 78 86 80 79   EGFR mL/min/1.73 124.9 123.0 124.9 126.3     Results from last 7 days   Lab Units 09/06/24  0334 09/05/24  0320 09/04/24  0307 09/02/24  2212   ALBUMIN g/dL 3.4* 3.4* 3.4* 4.1   BILIRUBIN mg/dL 0.2 0.2 0.3 0.3   ALK PHOS U/L 87 89 91 121*   AST (SGOT) U/L 14 15 14 14   ALT (SGPT) U/L 9 10 12 14      Results from last 7 days   Lab Units 09/06/24  0334 09/05/24  0320 09/04/24  0307 09/03/24  0420 09/02/24  2212   CALCIUM mg/dL 8.5* 8.9 8.8 8.8 9.5   ALBUMIN g/dL 3.4* 3.4* 3.4*  --  4.1   MAGNESIUM mg/dL 1.9 2.1 1.9  --   --    PHOSPHORUS mg/dL 3.0 4.2 2.3*  --   --      Results from last 7 days   Lab Units 09/04/24  0307 09/02/24  2212   LIPASE U/L 13 10*     Results from last 7 days   Lab Units 09/03/24  0420 09/03/24  0034 09/02/24  2212   HSTROP T ng/L 7 6 <6       Results from last 7 days   Lab Units 09/04/24  0307   CHOLESTEROL mg/dL 109   TRIGLYCERIDES mg/dL 109   HDL CHOL mg/dL 22*   LDL CHOL mg/dL 66             Test Results Pending at Discharge     Pending Labs       Order Current Status    Tissue Pathology Exam In process            Discharge Details        Discharge Medications        New Medications        Instructions Start Date   pantoprazole 40 MG EC tablet  Commonly known as: PROTONIX   40 mg, Oral, 2 Times Daily Before Meals      sucralfate 1 g tablet  Commonly known as: CARAFATE   1 g, Oral, 4 Times Daily Before Meals & Nightly               No Known Allergies    Discharge Disposition:  Home or Self Care      Discharge Diet:  Diet Order   Procedures    Diet: Regular/House; Fluid Consistency: Thin (IDDSI 0)       Discharge Activity:   As tolerated  No THC    CODE STATUS:    Code Status and Medical Interventions: CPR (Attempt to Resuscitate); Full Support   Ordered at: 09/03/24 0148     Code Status (Patient has no pulse and is not breathing):    CPR (Attempt to Resuscitate)     Medical Interventions (Patient has pulse or is breathing):    Full Support       Future Appointments   Date Time Provider Department Center   10/17/2024 12:30 PM Delaney Good APRN [facility]   3/20/2025  9:30 AM [facility]   3/20/2025 10:00 AM Noel Mcdowell MD [facility]     Additional Instructions for the Follow-ups that You Need to Schedule       Discharge Follow-up with PCP   As  directed       Currently Documented PCP:    Provider, No Known    PCP Phone Number:    [phone]     Follow Up Details: PCP of choice at next available appt        Discharge Follow-up with Specified Provider: Florentino RODRIGUEZ (GI) on October 17th   As directed      To: Florentino RODRIGUEZ (GI) on October 17th        Discharge Follow-up with Specified Provider: Dr. Mcdowell (Vasc Surg) on March 20th   As directed      To: Dr. Mcdowell (Vasc Surg) on March 20th               Follow-up Information       Provider, No Known .    Why: PCP of choice at next available appt  Contact information:  [place] [address]  [phone]                             Additional Instructions for the Follow-ups that You Need to Schedule       Discharge Follow-up with PCP   As directed       Currently Documented PCP:    Provider, No Known    PCP Phone Number:    [phone]     Follow Up Details: PCP of choice at next available appt        Discharge Follow-up with Specified Provider: Florentino RODRIGUEZ (GI) on October 17th   As directed      To: Florentino RODRIGUEZ (GI) on October 17th        Discharge Follow-up with Specified Provider: Dr. Mcdowell (Vasc Surg) on March 20th   As directed      To: Dr. Mcdowell (Vasc Surg) on March 20th            Time Spent on Discharge:  Greater than 30 minutes      Blayne Meyers MD  [facility]  09/06/24  11:37 EDT

## 2024-09-06 NOTE — CASE MANAGEMENT/SOCIAL WORK
Case Management Discharge Note      Final Note: Home with no needs. Transport by private auto.    Provided Post Acute Provider List?: N/A    Selected Continued Care - Discharged on 9/6/2024 Admission date: 9/2/2024 - Discharge disposition: Home or Self Care      Destination    No services have been selected for the patient.                Durable Medical Equipment    No services have been selected for the patient.                Dialysis/Infusion    No services have been selected for the patient.                Home Medical Care    No services have been selected for the patient.                Therapy    No services have been selected for the patient.                Community Resources    No services have been selected for the patient.                Community & DME    No services have been selected for the patient.                    Transportation Services  Private: Car    Final Discharge Disposition Code: 01 - home or self-care

## 2024-09-06 NOTE — PLAN OF CARE
No new acute issues this shift, pt AAO x4, pain and heartburn well controlled with Carafate and Norco.  Pt resting well in bed, no further acute issues, will continue to monitor and observe.     Goal Outcome Evaluation:  Plan of Care Reviewed With: patient        Progress: improving      Problem: Adult Inpatient Plan of Care  Goal: Plan of Care Review  Outcome: Ongoing, Progressing  Flowsheets (Taken 9/6/2024 3519)  Progress: improving  Plan of Care Reviewed With: patient     Problem: Pain Acute  Goal: Acceptable Pain Control and Functional Ability  Outcome: Ongoing, Progressing

## 2024-09-06 NOTE — PROGRESS NOTES
Gastroenterology   Inpatient Progress Note    Reason for Follow Up: Abdominal pain, abnormal imaging    Subjective  Interval History:   Patient reports heartburn symptoms characterized by substernal burning sensation improving without nausea or vomiting.  We discussed at length importance of complete marijuana cessation to allow healing of witnessed gastric ulcer.    Reviewed 9/5 EGD:    Normal esophagus.    Gastritis, characterized by erythema.    Normal examined duodenum. Biopsied.   Oozing gastric ulcer. Hemostatic spray applied.  Recommended continuing PPI and completing follow-up EGD in 8 weeks for surveillance and reassessment to monitor healing.    Current Facility-Administered Medications:     acetaminophen (TYLENOL) tablet 650 mg, 650 mg, Oral, Q4H PRN, 650 mg at 09/06/24 0810 **OR** acetaminophen (TYLENOL) 160 MG/5ML oral solution 650 mg, 650 mg, Oral, Q4H PRN **OR** acetaminophen (TYLENOL) suppository 650 mg, 650 mg, Rectal, Q4H PRN, Moni Kowalski APRN    sennosides-docusate (PERICOLACE) 8.6-50 MG per tablet 2 tablet, 2 tablet, Oral, BID PRN **AND** polyethylene glycol (MIRALAX) packet 17 g, 17 g, Oral, Daily PRN **AND** bisacodyl (DULCOLAX) EC tablet 5 mg, 5 mg, Oral, Daily PRN **AND** bisacodyl (DULCOLAX) suppository 10 mg, 10 mg, Rectal, Daily PRN, Moni Kowalski APRN    calcium carbonate (TUMS) chewable tablet 500 mg (200 mg elemental), 2 tablet, Oral, BID PRN, Moni Kowalski APRN    HYDROcodone-acetaminophen (NORCO) 5-325 MG per tablet 1 tablet, 1 tablet, Oral, Q6H PRN, Blayne Meyers MD, 1 tablet at 09/06/24 0307    morphine injection 1 mg, 1 mg, Intravenous, Q4H PRN, Blayne Meyers MD    nitroglycerin (NITROSTAT) SL tablet 0.4 mg, 0.4 mg, Sublingual, Q5 Min PRN, Moni Kowalski APRN    ondansetron ODT (ZOFRAN-ODT) disintegrating tablet 4 mg, 4 mg, Oral, Q6H PRN **OR** ondansetron (ZOFRAN) injection 4 mg, 4 mg, Intravenous, Q6H PRN, Moni Kowalski, APRN     pantoprazole (PROTONIX) EC tablet 40 mg, 40 mg, Oral, BID AC, Blayne Meyers MD, 40 mg at 09/06/24 0810    potassium chloride (K-DUR,KLOR-CON) ER tablet 40 mEq, 40 mEq, Oral, Q4H, Blayne Meyers MD, 40 mEq at 09/06/24 0810    Potassium Replacement - Follow Nurse / BPA Driven Protocol, , Does not apply, PRN, Blayne Meyers MD    sodium chloride 0.9 % flush 10 mL, 10 mL, Intravenous, PRN, Tim Hoang MD    sodium chloride 0.9 % flush 10 mL, 10 mL, Intravenous, Q12H, Moni Kowalski APRN, 10 mL at 09/06/24 0811    sodium chloride 0.9 % flush 10 mL, 10 mL, Intravenous, PRN, Moni Kowalski APRN    sodium chloride 0.9 % infusion 40 mL, 40 mL, Intravenous, PRN, Moni Kowalski APRN    sodium chloride 0.9 % infusion, 30 mL/hr, Intravenous, Continuous PRN, Tom Estes MD, Stopped at 09/05/24 1222    sucralfate (CARAFATE) tablet 1 g, 1 g, Oral, 4x Daily AC & at Bedtime, Blayne Meyers MD, 1 g at 09/06/24 0811  Review of Systems:               All systems were reviewed and negative except for:  Constitution:  positive for See HPI  Gastrointestinal: positive for  heartburn and See HPI    Objective     Vital Signs  Temp:  [97.5 °F (36.4 °C)-99 °F (37.2 °C)] 98.1 °F (36.7 °C)  Heart Rate:  [62-73] 62  Resp:  [10-18] 18  BP: ()/(34-94) 117/81  Body mass index is 20.92 kg/m².                  General Appearance:  awake, alert, oriented, in no acute distress  Abdomen:  Soft, non-tender, normal bowel sounds; no bruits, organomegaly or masses.                Results Review:                I reviewed the patient's new clinical results.    Results from last 7 days   Lab Units 09/06/24  0334 09/05/24  0320 09/04/24  0307   WBC 10*3/mm3 6.98 7.97 9.70   HEMOGLOBIN g/dL 11.9* 11.7* 12.1*   HEMATOCRIT % 35.9* 35.8* 36.3*   PLATELETS 10*3/mm3 282 289 232     Results from last 7 days   Lab Units 09/06/24  0334 09/05/24  0320 09/04/24  0307   SODIUM mmol/L 138 142 137   POTASSIUM mmol/L 3.4* 4.3 3.9    CHLORIDE mmol/L 103 104 100   CO2 mmol/L 29.0 27.1 24.3   BUN mg/dL 12 12 12   CREATININE mg/dL 0.79 0.83 0.79   CALCIUM mg/dL 8.5* 8.9 8.8   BILIRUBIN mg/dL 0.2 0.2 0.3   ALK PHOS U/L 87 89 91   ALT (SGPT) U/L 9 10 12   AST (SGOT) U/L 14 15 14   GLUCOSE mg/dL 78 86 80         Lab Results   Lab Value Date/Time    LIPASE 13 09/04/2024 0307    LIPASE 10 (L) 09/02/2024 2212       Radiology:  CT Angiogram Abdomen   Final Result      CT Angiogram Chest Pulmonary Embolism   Final Result       1. No acute pulmonary thromboembolus is seen.   2. The patient has an irregular appearance to the proximal celiac axis,   which raises the possibility of dissection. The celiac axis does remain   patent. The left gastric artery, splenic artery, and common hepatic   artery are all patent. There is a small outpouching which is seen   arising from the proximal celiac axis. 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 Vascular surgery consultation   is recommended.       Radiation dose reduction techniques were utilized, including automated   exposure control and exposure modulation based on body size.           This report was finalized on 9/2/2024 11:17 PM by Dr. Enriqueta Vivas M.D on Workstation: [id number]          CT Abdomen Pelvis With Contrast   Final Result       1. No acute pulmonary thromboembolus is seen.   2. The patient has an irregular appearance to the proximal celiac axis,   which raises the possibility of dissection. The celiac axis does remain   patent. The left gastric artery, splenic artery, and common hepatic   artery are all patent. There is a small outpouching which is seen   arising from the proximal celiac axis. 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 Vascular surgery consultation   is recommended.       Radiation dose reduction techniques were utilized, including automated   exposure control and exposure modulation based on body size.            This report was finalized on 9/2/2024 11:17 PM by Dr. Enriqueta Vivas M.D on Workstation: [id number]          XR Chest 1 View   Final Result   No acute findings.       This report was finalized on 9/2/2024 10:38 PM by Dr. Enriqueta Vivas M.D on Workstation: [id number]              Assessment & Plan     Active Hospital Problems    Diagnosis     **Acute gastric ulcer with hemorrhage     Iron deficiency anemia due to chronic blood loss     Abnormal computed tomography angiography (CTA) of abdomen and pelvis     Leukocytosis     Chest pain     Nausea & vomiting     Dyspnea     Abnormal EKG     Epigastric pain        Assessment:  Abnormal CT of the abdomen pelvis  vascular surgery consulted, repeat CTA performed, patient with normal variant of anatomy at the base of the celiac, no dissection or pseudoaneurysm.   Epigastric abdominal pain  Anemia-stable  Leukocytosis  Nausea and vomiting-resolved  Daily marijuana use    These problems are new to me  Plan:  9/5 EGD with evidence of acute gastric ulcer and hemorrhage successfully treated with hemostatic spray.  Recommend follow-up EGD for surveillance of healing in 8 weeks  Continue PPI BID  and sucralfate 4 times daily   Encouraged cessation of marijuana use as this can slow GI motility, contribute to nausea and vomiting, and result in further upper GI irritation  Will plan for patient to follow-up in 4 weeks for continued monitoring and scheduling of EGD.  Appointment scheduled on 10/17 with KRISTEN Do.    GI will sign off for now.  As always, thank you for allowing us to participate in the care of your patient.  If we can be of further assistance please not hesitate to reach out to us.      I discussed the patients findings and my recommendations with patient.          KRISTEN Elizabeth  [facility], [facility] [address] [address]

## 2024-09-08 ENCOUNTER — HOSPITAL ENCOUNTER (EMERGENCY)
Facility: HOSPITAL | Age: 29
Discharge: HOME | End: 2024-09-08
Attending: EMERGENCY MEDICINE | Admitting: EMERGENCY MEDICINE
Payer: MEDICAID

## 2024-09-08 VITALS
TEMPERATURE: 97.9 F | DIASTOLIC BLOOD PRESSURE: 78 MMHG | WEIGHT: 150 LBS | BODY MASS INDEX: 21 KG/M2 | HEIGHT: 71 IN | OXYGEN SATURATION: 99 % | HEART RATE: 73 BPM | RESPIRATION RATE: 16 BRPM | SYSTOLIC BLOOD PRESSURE: 117 MMHG

## 2024-09-08 DIAGNOSIS — R10.13 EPIGASTRIC PAIN: Primary | ICD-10-CM

## 2024-09-08 DIAGNOSIS — R11.2 NAUSEA AND VOMITING, UNSPECIFIED VOMITING TYPE: ICD-10-CM

## 2024-09-08 LAB
ALBUMIN SERPL-MCNC: 4 G/DL (ref 3.5–5.2)
ALBUMIN/GLOB SERPL: 1 G/DL
ALP SERPL-CCNC: 95 U/L (ref 39–117)
ALT SERPL W P-5'-P-CCNC: 11 U/L (ref 1–41)
ANION GAP SERPL CALCULATED.3IONS-SCNC: 9 MMOL/L (ref 5–15)
APTT PPP: 45 SECONDS (ref 22.7–35.4)
AST SERPL-CCNC: 18 U/L (ref 1–40)
BACTERIA UR QL AUTO: ABNORMAL /HPF
BASOPHILS # BLD AUTO: 0.05 10*3/MM3 (ref 0–0.2)
BASOPHILS NFR BLD AUTO: 0.6 % (ref 0–1.5)
BILIRUB SERPL-MCNC: 0.3 MG/DL (ref 0–1.2)
BILIRUB UR QL STRIP: NEGATIVE
BUN SERPL-MCNC: 13 MG/DL (ref 6–20)
BUN/CREAT SERPL: 13.8 (ref 7–25)
CALCIUM SPEC-SCNC: 9.3 MG/DL (ref 8.6–10.5)
CHLORIDE SERPL-SCNC: 101 MMOL/L (ref 98–107)
CLARITY UR: CLEAR
CO2 SERPL-SCNC: 28 MMOL/L (ref 22–29)
COLOR UR: ABNORMAL
CREAT SERPL-MCNC: 0.94 MG/DL (ref 0.76–1.27)
DEPRECATED RDW RBC AUTO: 37.9 FL (ref 37–54)
EGFRCR SERPLBLD CKD-EPI 2021: 113.9 ML/MIN/1.73
EOSINOPHIL # BLD AUTO: 0.09 10*3/MM3 (ref 0–0.4)
EOSINOPHIL NFR BLD AUTO: 1.1 % (ref 0.3–6.2)
ERYTHROCYTE [DISTWIDTH] IN BLOOD BY AUTOMATED COUNT: 12.4 % (ref 12.3–15.4)
GLOBULIN UR ELPH-MCNC: 3.9 GM/DL
GLUCOSE SERPL-MCNC: 83 MG/DL (ref 65–99)
GLUCOSE UR STRIP-MCNC: NEGATIVE MG/DL
HCT VFR BLD AUTO: 40.6 % (ref 37.5–51)
HGB BLD-MCNC: 13.1 G/DL (ref 13–17.7)
HGB UR QL STRIP.AUTO: NEGATIVE
HYALINE CASTS UR QL AUTO: ABNORMAL /LPF
IMM GRANULOCYTES # BLD AUTO: 0.01 10*3/MM3 (ref 0–0.05)
IMM GRANULOCYTES NFR BLD AUTO: 0.1 % (ref 0–0.5)
INR PPP: 1.19 (ref 0.9–1.1)
KETONES UR QL STRIP: ABNORMAL
LEUKOCYTE ESTERASE UR QL STRIP.AUTO: ABNORMAL
LIPASE SERPL-CCNC: 18 U/L (ref 13–60)
LYMPHOCYTES # BLD AUTO: 3.07 10*3/MM3 (ref 0.7–3.1)
LYMPHOCYTES NFR BLD AUTO: 37.8 % (ref 19.6–45.3)
MCH RBC QN AUTO: 26.6 PG (ref 26.6–33)
MCHC RBC AUTO-ENTMCNC: 32.3 G/DL (ref 31.5–35.7)
MCV RBC AUTO: 82.4 FL (ref 79–97)
MONOCYTES # BLD AUTO: 0.72 10*3/MM3 (ref 0.1–0.9)
MONOCYTES NFR BLD AUTO: 8.9 % (ref 5–12)
NEUTROPHILS NFR BLD AUTO: 4.19 10*3/MM3 (ref 1.7–7)
NEUTROPHILS NFR BLD AUTO: 51.5 % (ref 42.7–76)
NITRITE UR QL STRIP: NEGATIVE
NRBC BLD AUTO-RTO: 0 /100 WBC (ref 0–0.2)
PH UR STRIP.AUTO: 5.5 [PH] (ref 5–8)
PLATELET # BLD AUTO: 368 10*3/MM3 (ref 140–450)
PMV BLD AUTO: 10.4 FL (ref 6–12)
POTASSIUM SERPL-SCNC: 3.7 MMOL/L (ref 3.5–5.2)
PROT SERPL-MCNC: 7.9 G/DL (ref 6–8.5)
PROT UR QL STRIP: ABNORMAL
PROTHROMBIN TIME: 15.3 SECONDS (ref 11.7–14.2)
RBC # BLD AUTO: 4.93 10*6/MM3 (ref 4.14–5.8)
RBC # UR STRIP: ABNORMAL /HPF
REF LAB TEST METHOD: ABNORMAL
SODIUM SERPL-SCNC: 138 MMOL/L (ref 136–145)
SP GR UR STRIP: >1.03 (ref 1–1.03)
SQUAMOUS #/AREA URNS HPF: ABNORMAL /HPF
UROBILINOGEN UR QL STRIP: ABNORMAL
WBC # UR STRIP: ABNORMAL /HPF
WBC NRBC COR # BLD AUTO: 8.13 10*3/MM3 (ref 3.4–10.8)

## 2024-09-08 PROCEDURE — 36415 COLL VENOUS BLD VENIPUNCTURE: CPT

## 2024-09-08 PROCEDURE — 25010000002 ONDANSETRON PER 1 MG: Performed by: EMERGENCY MEDICINE

## 2024-09-08 PROCEDURE — 85610 PROTHROMBIN TIME: CPT | Performed by: EMERGENCY MEDICINE

## 2024-09-08 PROCEDURE — 96374 THER/PROPH/DIAG INJ IV PUSH: CPT

## 2024-09-08 PROCEDURE — 83690 ASSAY OF LIPASE: CPT | Performed by: EMERGENCY MEDICINE

## 2024-09-08 PROCEDURE — 85730 THROMBOPLASTIN TIME PARTIAL: CPT | Performed by: EMERGENCY MEDICINE

## 2024-09-08 PROCEDURE — 85025 COMPLETE CBC W/AUTO DIFF WBC: CPT | Performed by: EMERGENCY MEDICINE

## 2024-09-08 PROCEDURE — 81001 URINALYSIS AUTO W/SCOPE: CPT | Performed by: EMERGENCY MEDICINE

## 2024-09-08 PROCEDURE — 80053 COMPREHEN METABOLIC PANEL: CPT | Performed by: EMERGENCY MEDICINE

## 2024-09-08 PROCEDURE — 25010000002 MORPHINE PER 10 MG: Performed by: EMERGENCY MEDICINE

## 2024-09-08 PROCEDURE — 96375 TX/PRO/DX INJ NEW DRUG ADDON: CPT

## 2024-09-08 PROCEDURE — 99283 EMERGENCY DEPT VISIT LOW MDM: CPT

## 2024-09-08 RX ORDER — ONDANSETRON 2 MG/ML
4 INJECTION INTRAMUSCULAR; INTRAVENOUS ONCE
Status: COMPLETED | OUTPATIENT
Start: 2024-09-08 | End: 2024-09-08

## 2024-09-08 RX ORDER — ONDANSETRON 4 MG/1
4 TABLET, ORALLY DISINTEGRATING ORAL EVERY 8 HOURS PRN
Qty: 10 TABLET | Refills: 0 | Status: SHIPPED | OUTPATIENT
Start: 2024-09-08

## 2024-09-08 RX ORDER — SODIUM CHLORIDE 0.9 % (FLUSH) 0.9 %
10 SYRINGE (ML) INJECTION AS NEEDED
Status: DISCONTINUED | OUTPATIENT
Start: 2024-09-08 | End: 2024-09-08 | Stop reason: HOSPADM

## 2024-09-08 RX ORDER — MORPHINE SULFATE 2 MG/ML
2 INJECTION, SOLUTION INTRAMUSCULAR; INTRAVENOUS ONCE
Status: COMPLETED | OUTPATIENT
Start: 2024-09-08 | End: 2024-09-08

## 2024-09-08 RX ORDER — PANTOPRAZOLE SODIUM 40 MG/10ML
40 INJECTION, POWDER, LYOPHILIZED, FOR SOLUTION INTRAVENOUS ONCE
Status: COMPLETED | OUTPATIENT
Start: 2024-09-08 | End: 2024-09-08

## 2024-09-08 RX ADMIN — MORPHINE SULFATE 2 MG: 2 INJECTION, SOLUTION INTRAMUSCULAR; INTRAVENOUS at 01:17

## 2024-09-08 RX ADMIN — ONDANSETRON 4 MG: 2 INJECTION, SOLUTION INTRAMUSCULAR; INTRAVENOUS at 01:16

## 2024-09-08 RX ADMIN — PANTOPRAZOLE SODIUM 40 MG: 40 INJECTION, POWDER, FOR SOLUTION INTRAVENOUS at 01:17

## 2024-09-08 NOTE — ED TRIAGE NOTES
Pt presents to ED via private vehicle with epigastric pain, nausea, and vomiting Xs 6 hours after he woke up. States he was discharged from here yesterday due to stomach ulcer. Pain and vomiting worsening after eating. Denies bloody emesis.

## 2024-09-08 NOTE — ED PROVIDER NOTES
" EMERGENCY DEPARTMENT ENCOUNTER    Room Number:  09/09  PCP: Provider, No Known  Patient Care Team:  Provider, No Known as PCP - General   Independent Historians: Patient    HPI:  Chief Complaint: Abdominal pain, nausea    A complete HPI/ROS/PMH/PSH/SH/FH are unobtainable due to: None    Chronic or social conditions impacting patient care (Social Determinants of Health): None  (Financial Resource Strain / Food Insecurity / Transportation Needs / Physical Activity / Stress / Social Connections / Intimate Partner Violence / Housing Stability)    Context: Anders Figueredo is a 28 y.o. male who presents to the ED c/o acute epigastric abdominal pain.  Nausea vomiting.  States unable to hold down any food today.  Was discharged from the hospital yesterday admitted with bleeding gastric ulcer and a possible abnormality on the abdominal CTA which was thought to be normal celiac artery anatomic variant.  Patient was recently [facility] and since returning home has had pain and inability to tolerate p.o.  States he feels like he is \"right back where I started\".  Review of prior external notes (non-ED) -and- Review of prior external test results outside of this encounter: I reviewed patient's discharge summary from 9/6/2024    Prescription drug monitoring program review:         PAST MEDICAL HISTORY  Active Ambulatory Problems     Diagnosis Date Noted    Abnormal computed tomography angiography (CTA) of abdomen and pelvis 09/03/2024    Leukocytosis 09/03/2024    Chest pain 09/03/2024    Nausea & vomiting 09/03/2024    Dyspnea 09/03/2024    Abnormal EKG 09/03/2024    Epigastric pain 09/02/2024    Acute gastric ulcer with hemorrhage 09/05/2024    Iron deficiency anemia due to chronic blood loss 09/05/2024     Resolved Ambulatory Problems     Diagnosis Date Noted    No Resolved Ambulatory Problems     No Additional Past Medical History         PAST SURGICAL HISTORY  Past Surgical History:   Procedure Laterality Date    " ENDOSCOPY N/A 9/5/2024    Procedure: ESOPHAGOGASTRODUODENOSCOPY with bx and endoclot;  Surgeon: Tom Estes MD;  Location: [facility] ENDOSCOPY;  Service: Gastroenterology;  Laterality: N/A;  pre- abd pain  post- ulcer    WISDOM TOOTH EXTRACTION  2023         FAMILY HISTORY  History reviewed. No pertinent family history.      SOCIAL HISTORY  Social History     Socioeconomic History    Marital status: Single   Tobacco Use    Smoking status: Never    Smokeless tobacco: Never   Vaping Use    Vaping status: Never Used   Substance and Sexual Activity    Drug use: Yes     Types: Marijuana     Comment: Pt reports smoking cannibis    Sexual activity: Defer         ALLERGIES  Patient has no known allergies.        REVIEW OF SYSTEMS  Review of Systems  Included in HPI  All systems reviewed and negative except for those discussed in HPI.      PHYSICAL EXAM    I have reviewed the triage vital signs and nursing notes.    ED Triage Vitals   Temp Heart Rate Resp BP SpO2   09/08/24 0057 09/08/24 0057 09/08/24 0057 09/08/24 0103 09/08/24 0057   97.9 °F (36.6 °C) 106 16 124/81 97 %      Temp src Heart Rate Source Patient Position BP Location FiO2 (%)   -- -- 09/08/24 0103 09/08/24 0103 --     Lying Right arm        Physical Exam  GENERAL: alert, no acute distress  SKIN: Warm, dry  HENT: Normocephalic, atraumatic  EYES: no scleral icterus  CV: regular rhythm, regular rate  RESPIRATORY: normal effort, lungs clear  ABDOMEN: soft, patient tender palpation over epigastrium no rebound or guarding, nondistended  MUSCULOSKELETAL: no deformity  NEURO: alert, moves all extremities, follows commands                                                               LAB RESULTS  Recent Results (from the past 24 hour(s))   Comprehensive Metabolic Panel    Collection Time: 09/08/24  1:15 AM    Specimen: Arm, Right; Blood   Result Value Ref Range    Glucose 83 65 - 99 mg/dL    BUN 13 6 - 20 mg/dL    Creatinine 0.94 0.76 - 1.27 mg/dL    Sodium 138 136  - 145 mmol/L    Potassium 3.7 3.5 - 5.2 mmol/L    Chloride 101 98 - 107 mmol/L    CO2 28.0 22.0 - 29.0 mmol/L    Calcium 9.3 8.6 - 10.5 mg/dL    Total Protein 7.9 6.0 - 8.5 g/dL    Albumin 4.0 3.5 - 5.2 g/dL    ALT (SGPT) 11 1 - 41 U/L    AST (SGOT) 18 1 - 40 U/L    Alkaline Phosphatase 95 39 - 117 U/L    Total Bilirubin 0.3 0.0 - 1.2 mg/dL    Globulin 3.9 gm/dL    A/G Ratio 1.0 g/dL    BUN/Creatinine Ratio 13.8 7.0 - 25.0    Anion Gap 9.0 5.0 - 15.0 mmol/L    eGFR 113.9 >60.0 mL/min/1.73   Protime-INR    Collection Time: 09/08/24  1:15 AM    Specimen: Arm, Right; Blood   Result Value Ref Range    Protime 15.3 (H) 11.7 - 14.2 Seconds    INR 1.19 (H) 0.90 - 1.10   aPTT    Collection Time: 09/08/24  1:15 AM    Specimen: Arm, Right; Blood   Result Value Ref Range    PTT 45.0 (H) 22.7 - 35.4 seconds   Lipase    Collection Time: 09/08/24  1:15 AM    Specimen: Arm, Right; Blood   Result Value Ref Range    Lipase 18 13 - 60 U/L   CBC Auto Differential    Collection Time: 09/08/24  1:15 AM    Specimen: Arm, Right; Blood   Result Value Ref Range    WBC 8.13 3.40 - 10.80 10*3/mm3    RBC 4.93 4.14 - 5.80 10*6/mm3    Hemoglobin 13.1 13.0 - 17.7 g/dL    Hematocrit 40.6 37.5 - 51.0 %    MCV 82.4 79.0 - 97.0 fL    MCH 26.6 26.6 - 33.0 pg    MCHC 32.3 31.5 - 35.7 g/dL    RDW 12.4 12.3 - 15.4 %    RDW-SD 37.9 37.0 - 54.0 fl    MPV 10.4 6.0 - 12.0 fL    Platelets 368 140 - 450 10*3/mm3    Neutrophil % 51.5 42.7 - 76.0 %    Lymphocyte % 37.8 19.6 - 45.3 %    Monocyte % 8.9 5.0 - 12.0 %    Eosinophil % 1.1 0.3 - 6.2 %    Basophil % 0.6 0.0 - 1.5 %    Immature Grans % 0.1 0.0 - 0.5 %    Neutrophils, Absolute 4.19 1.70 - 7.00 10*3/mm3    Lymphocytes, Absolute 3.07 0.70 - 3.10 10*3/mm3    Monocytes, Absolute 0.72 0.10 - 0.90 10*3/mm3    Eosinophils, Absolute 0.09 0.00 - 0.40 10*3/mm3    Basophils, Absolute 0.05 0.00 - 0.20 10*3/mm3    Immature Grans, Absolute 0.01 0.00 - 0.05 10*3/mm3    nRBC 0.0 0.0 - 0.2 /100 WBC   Urinalysis With  Microscopic If Indicated (No Culture) - Urine, Clean Catch    Collection Time: 09/08/24  2:26 AM    Specimen: Urine, Clean Catch   Result Value Ref Range    Color, UA Dark Yellow (A) Yellow, Straw    Appearance, UA Clear Clear    pH, UA 5.5 5.0 - 8.0    Specific Gravity, UA >1.030 (H) 1.005 - 1.030    Glucose, UA Negative Negative    Ketones, UA Trace (A) Negative    Bilirubin, UA Negative Negative    Blood, UA Negative Negative    Protein, UA Trace (A) Negative    Leuk Esterase, UA Trace (A) Negative    Nitrite, UA Negative Negative    Urobilinogen, UA 1.0 E.U./dL 0.2 - 1.0 E.U./dL   Urinalysis, Microscopic Only - Urine, Clean Catch    Collection Time: 09/08/24  2:26 AM    Specimen: Urine, Clean Catch   Result Value Ref Range    RBC, UA 0-2 None Seen, 0-2 /HPF    WBC, UA 3-5 (A) None Seen, 0-2 /HPF    Bacteria, UA None Seen None Seen /HPF    Squamous Epithelial Cells, UA 0-2 None Seen, 0-2 /HPF    Hyaline Casts, UA 3-6 None Seen /LPF    Methodology Automated Microscopy        I ordered the above labs and independently reviewed the results.        RADIOLOGY  No Radiology Exams Resulted Within Past 24 Hours    I ordered the above noted radiological studies. Reviewed by me and discussed with radiologist.  See dictation for official radiology interpretation.      PROCEDURES    Procedures      MEDICATIONS GIVEN IN ER    Medications   morphine injection 2 mg (2 mg Intravenous Given 9/8/24 0117)   ondansetron (ZOFRAN) injection 4 mg (4 mg Intravenous Given 9/8/24 0116)   pantoprazole (PROTONIX) injection 40 mg (40 mg Intravenous Given 9/8/24 0117)         ORDERS PLACED DURING THIS VISIT:  Orders Placed This Encounter   Procedures    Comprehensive Metabolic Panel    Protime-INR    aPTT    Lipase    Urinalysis With Microscopic If Indicated (No Culture) - Urine, Clean Catch    CBC Auto Differential    Urinalysis, Microscopic Only - Urine, Clean Catch    Monitor Blood Pressure    Continuous Pulse Oximetry    Po challenge   Misc Nursing Order (Specify)    CBC & Differential         PROGRESS, DATA ANALYSIS, CONSULTS, AND MEDICAL DECISION MAKING    All labs have been independently interpreted by me.  All radiology studies have been reviewed by me and discussed with radiologist dictating the report.   EKG's independently viewed and interpreted by me.  Discussion below represents my analysis of pertinent findings related to patient's condition, differential diagnosis, treatment plan and final disposition.    Differential diagnosis includes but is not limited to My differential diagnosis for nausea vomiting includes but is not limited to:  Medications, toxins, ethanol abuse, radiation, hypervitaminosis, jamacian vomiting sickness, gastroenteritis, otitis media, gastric outlet obstruction, SBO, function GI disorders, gastroparesis, chronic intestinal pseudoobstruction, nonulcer dyspepsia, irritable bowel syndrome, pancreatic adenocarcinoma, inflammatory intraperitoneal disease, peptic ulcer disease, cholecystitis, pancreatitis, hepatitis, Crohn's disease, mesenteric ischemia, retroperitoneal fibrosis, mucosal metastases, migraine, increased intracranial pressure, seizures, demyelinating disorders, emotional response, psychiatric causes, motion sickness, labyrinthitis, brain tumors, Ménière's disease, pregnancy, uremia, diabetic ketoacidosis, hyperparathyroidism, hypoparathyroidism, hypothyroidism, [place]'s disease, acute intermittent porphyria, postoperative nausea vomiting, cyclic vomiting syndrome, cannabinol hyperemesis syndrome, MI, starvation  .    Clinical Scores:              ED Course as of 09/08/24 0530   Sun Sep 08, 2024   0133 WBC: 8.13 [TJ]   0134 Hemoglobin: 13.1 [TJ]   0134 Platelets: 368 [TJ]   0244 Ketones, UA(!): Trace [TJ]   0249 Reevaluation: Patient is feeling better.  Tolerating p.o.  Will discharge home. [TJ]      ED Course User Index  [TJ] Rei Day MD               PPE: The patient wore a mask and I wore  an N95 mask throughout the entire patient encounter.       AS OF 05:30 EDT VITALS:    BP - 117/78  HR - 73  TEMP - 97.9 °F (36.6 °C)  O2 SATS - 99%        DIAGNOSIS  Final diagnoses:   Epigastric pain   Nausea and vomiting, unspecified vomiting type         DISPOSITION  ED Disposition       ED Disposition   Discharge    Condition   Stable    Comment   --                  Note Disclaimer: At [facility], we believe that sharing information builds trust and better relationships. You are receiving this note because you recently visited [facility]. It is possible you will see health information before a provider has talked with you about it. This kind of information can be easy to misunderstand. To help you fully understand what it means for your health, we urge you to discuss this note with your provider.         Rei Day MD  09/08/24 7429

## 2024-09-18 ENCOUNTER — PREP FOR SURGERY (OUTPATIENT)
Dept: SURGERY | Facility: SURGERY CENTER | Age: 29
End: 2024-09-18
Payer: MEDICAID

## 2024-09-18 DIAGNOSIS — K25.0 ACUTE GASTRIC ULCER WITH BLEEDING: Primary | ICD-10-CM

## 2024-09-18 DIAGNOSIS — K29.61 OTHER GASTRITIS WITH BLEEDING, UNSPECIFIED CHRONICITY: ICD-10-CM

## 2024-09-20 RX ORDER — SODIUM CHLORIDE 0.9 % (FLUSH) 0.9 %
3 SYRINGE (ML) INJECTION EVERY 12 HOURS SCHEDULED
OUTPATIENT
Start: 2024-09-20

## 2024-09-20 RX ORDER — SODIUM CHLORIDE 0.9 % (FLUSH) 0.9 %
10 SYRINGE (ML) INJECTION AS NEEDED
OUTPATIENT
Start: 2024-09-20

## 2024-09-20 RX ORDER — SODIUM CHLORIDE, SODIUM LACTATE, POTASSIUM CHLORIDE, CALCIUM CHLORIDE 600; 310; 30; 20 MG/100ML; MG/100ML; MG/100ML; MG/100ML
30 INJECTION, SOLUTION INTRAVENOUS CONTINUOUS PRN
OUTPATIENT
Start: 2024-09-20

## 2024-10-17 ENCOUNTER — OFFICE VISIT (OUTPATIENT)
Dept: GASTROENTEROLOGY | Facility: CLINIC | Age: 29
End: 2024-10-17
Payer: MEDICAID

## 2024-10-17 ENCOUNTER — LAB (OUTPATIENT)
Dept: LAB | Facility: HOSPITAL | Age: 29
End: 2024-10-17
Payer: MEDICAID

## 2024-10-17 ENCOUNTER — PREP FOR SURGERY (OUTPATIENT)
Dept: SURGERY | Facility: SURGERY CENTER | Age: 29
End: 2024-10-17
Payer: MEDICAID

## 2024-10-17 VITALS
HEART RATE: 85 BPM | OXYGEN SATURATION: 99 % | BODY MASS INDEX: 18.47 KG/M2 | DIASTOLIC BLOOD PRESSURE: 70 MMHG | WEIGHT: 131.9 LBS | TEMPERATURE: 97.8 F | SYSTOLIC BLOOD PRESSURE: 112 MMHG | HEIGHT: 71 IN

## 2024-10-17 DIAGNOSIS — R10.13 EPIGASTRIC PAIN: ICD-10-CM

## 2024-10-17 DIAGNOSIS — K25.4 GASTRIC ULCER WITH HEMORRHAGE, UNSPECIFIED CHRONICITY: Primary | ICD-10-CM

## 2024-10-17 DIAGNOSIS — K25.0 ACUTE GASTRIC ULCER WITH BLEEDING: Primary | ICD-10-CM

## 2024-10-17 DIAGNOSIS — R11.2 NAUSEA AND VOMITING, UNSPECIFIED VOMITING TYPE: ICD-10-CM

## 2024-10-17 PROBLEM — K29.61 OTHER GASTRITIS WITH BLEEDING: Status: ACTIVE | Noted: 2024-09-18

## 2024-10-17 LAB
BASOPHILS # BLD AUTO: 0.07 10*3/MM3 (ref 0–0.2)
BASOPHILS NFR BLD AUTO: 0.7 % (ref 0–1.5)
DEPRECATED RDW RBC AUTO: 43.8 FL (ref 37–54)
EOSINOPHIL # BLD AUTO: 0.09 10*3/MM3 (ref 0–0.4)
EOSINOPHIL NFR BLD AUTO: 0.9 % (ref 0.3–6.2)
ERYTHROCYTE [DISTWIDTH] IN BLOOD BY AUTOMATED COUNT: 14.6 % (ref 12.3–15.4)
HCT VFR BLD AUTO: 39.3 % (ref 37.5–51)
HGB BLD-MCNC: 12.2 G/DL (ref 13–17.7)
IMM GRANULOCYTES # BLD AUTO: 0.02 10*3/MM3 (ref 0–0.05)
IMM GRANULOCYTES NFR BLD AUTO: 0.2 % (ref 0–0.5)
LYMPHOCYTES # BLD AUTO: 3.84 10*3/MM3 (ref 0.7–3.1)
LYMPHOCYTES NFR BLD AUTO: 38 % (ref 19.6–45.3)
MCH RBC QN AUTO: 25.4 PG (ref 26.6–33)
MCHC RBC AUTO-ENTMCNC: 31 G/DL (ref 31.5–35.7)
MCV RBC AUTO: 81.9 FL (ref 79–97)
MONOCYTES # BLD AUTO: 1.02 10*3/MM3 (ref 0.1–0.9)
MONOCYTES NFR BLD AUTO: 10.1 % (ref 5–12)
NEUTROPHILS NFR BLD AUTO: 5.07 10*3/MM3 (ref 1.7–7)
NEUTROPHILS NFR BLD AUTO: 50.1 % (ref 42.7–76)
NRBC BLD AUTO-RTO: 0 /100 WBC (ref 0–0.2)
PLATELET # BLD AUTO: 313 10*3/MM3 (ref 140–450)
PMV BLD AUTO: 10.1 FL (ref 6–12)
RBC # BLD AUTO: 4.8 10*6/MM3 (ref 4.14–5.8)
WBC NRBC COR # BLD AUTO: 10.11 10*3/MM3 (ref 3.4–10.8)

## 2024-10-17 PROCEDURE — 1160F RVW MEDS BY RX/DR IN RCRD: CPT | Performed by: NURSE PRACTITIONER

## 2024-10-17 PROCEDURE — 1159F MED LIST DOCD IN RCRD: CPT | Performed by: NURSE PRACTITIONER

## 2024-10-17 PROCEDURE — 85025 COMPLETE CBC W/AUTO DIFF WBC: CPT | Performed by: NURSE PRACTITIONER

## 2024-10-17 PROCEDURE — 99214 OFFICE O/P EST MOD 30 MIN: CPT | Performed by: NURSE PRACTITIONER

## 2024-10-17 PROCEDURE — 36415 COLL VENOUS BLD VENIPUNCTURE: CPT | Performed by: NURSE PRACTITIONER

## 2024-10-17 RX ORDER — ESOMEPRAZOLE MAGNESIUM 40 MG/1
40 CAPSULE, DELAYED RELEASE ORAL 2 TIMES DAILY
Qty: 60 CAPSULE | Refills: 2 | Status: SHIPPED | OUTPATIENT
Start: 2024-10-17 | End: 2024-10-18 | Stop reason: SDUPTHER

## 2024-10-17 RX ORDER — SODIUM CHLORIDE 0.9 % (FLUSH) 0.9 %
3 SYRINGE (ML) INJECTION EVERY 12 HOURS SCHEDULED
OUTPATIENT
Start: 2024-10-17

## 2024-10-17 RX ORDER — SODIUM CHLORIDE 0.9 % (FLUSH) 0.9 %
10 SYRINGE (ML) INJECTION AS NEEDED
OUTPATIENT
Start: 2024-10-17

## 2024-10-17 RX ORDER — SUCRALFATE ORAL 1 G/10ML
1 SUSPENSION ORAL 3 TIMES DAILY
Qty: 1200 ML | Refills: 1 | Status: SHIPPED | OUTPATIENT
Start: 2024-10-17 | End: 2024-10-18 | Stop reason: SDUPTHER

## 2024-10-17 RX ORDER — SODIUM CHLORIDE, SODIUM LACTATE, POTASSIUM CHLORIDE, CALCIUM CHLORIDE 600; 310; 30; 20 MG/100ML; MG/100ML; MG/100ML; MG/100ML
30 INJECTION, SOLUTION INTRAVENOUS CONTINUOUS PRN
OUTPATIENT
Start: 2024-10-17 | End: 2024-10-17

## 2024-10-17 NOTE — PATIENT INSTRUCTIONS
For gastric ulcer, stop pantoprazole and start esomeprazole as prescribed.  Stop sucralfate pills and start Carafate liquid as prescribed.    Recommend avoiding NSAID medications and Aspirin. If needed for pain, it is okay to take Tylenol (acetaminophen) available over-the-counter.  Do not exceed recommended daily dose.    Avoid foods that can trigger symptoms which may include citrus fruits, spicy foods, tomatoes and red sauces, chocolate, coffee/tea, caffeinated or carbonated beverages, alcohol.    Schedule EGD to follow-up and evaluate for ulcer healing.    Follow-up with vascular surgery as recommended.

## 2024-10-17 NOTE — PROGRESS NOTES
"Chief Complaint   Patient presents with    Abdominal Pain         History of Present Illness  Patient is a 28-year-old male who presents today for Evaluation.  He was hospitalized in September with chest and upper abdominal pain.  He underwent EGD which showed a large bleeding gastric ulcer.  He was started with PPI and Carafate.  There was abnormal appearance of the celiac axis on CT scan for which vascular surgery was consulted and was felt this was a normal variant.  They are planning follow-up ultrasound in 4 to 6 months.    Patient presents today for follow-up.  Reports he has had continued symptoms since hospitalization and has seen no improvement.  He has been taking pantoprazole 40 mg twice daily and sucralfate 4 times daily.  He reports continued pain to his epigastric area that is present on a constant basis but is worse after he eats.  He has continued to have nausea and vomiting on a daily basis and reports dark stools.  He has lost around 20 pounds since hospitalization.     Result Review :       Tissue Pathology Exam (09/05/2024 11:03)    Upper GI Endoscopy (09/05/2024 10:51)    CT Angiogram Abdomen (09/03/2024 14:52)    CT Angiogram Chest Pulmonary Embolism (09/02/2024 22:45)    ED to Hosp-Admission (Discharged) with Blayne Myeers MD; Ryan Mitchell MD (09/02/2024)    Vital Signs:   /70   Pulse 85   Temp 97.8 °F (36.6 °C)   Ht 180.3 cm (71\")   Wt 59.8 kg (131 lb 14.4 oz)   SpO2 99%   BMI 18.40 kg/m²     Body mass index is 18.4 kg/m².     Physical Exam  Vitals reviewed.   Constitutional:       General: He is not in acute distress.     Appearance: He is well-developed.   HENT:      Head: Normocephalic and atraumatic.   Pulmonary:      Effort: Pulmonary effort is normal. No respiratory distress.   Abdominal:      General: Abdomen is flat. Bowel sounds are normal. There is no distension.      Palpations: Abdomen is soft.      Tenderness: There is abdominal tenderness in the epigastric " area.   Skin:     General: Skin is dry.      Coloration: Skin is not pale.   Neurological:      Mental Status: He is alert and oriented to person, place, and time.   Psychiatric:         Thought Content: Thought content normal.           Assessment and Plan    Diagnoses and all orders for this visit:    1. Gastric ulcer with hemorrhage, unspecified chronicity (Primary)  -     CBC & Differential    2. Epigastric pain    3. Nausea and vomiting, unspecified vomiting type    Other orders  -     esomeprazole (nexIUM) 40 MG capsule; Take 1 capsule by mouth 2 (Two) Times a Day.  Dispense: 60 capsule; Refill: 2  -     sucralfate (Carafate) 1 GM/10ML suspension; Take 10 mL by mouth 3 (Three) Times a Day.  Dispense: 1200 mL; Refill: 1         Discussion  Patient presents today for follow-up after hospitalization for epigastric pain during which time he was found to have a bleeding gastric ulcer of significant size.  Discussed with patient this can take several months to heal and I feel this is likely the source of his ongoing symptoms.  As he has seen no improvement with pantoprazole and sucralfate, will change to alternative PPI and Carafate liquid.  We will schedule follow-up EGD to evaluate for healing of ulcer.  If ulcer has healed and symptoms persist, would consider gastric emptying study next.  Patient also encouraged to follow-up with vascular surgery as recommended.          Follow Up   No follow-ups on file.    Patient Instructions   For gastric ulcer, stop pantoprazole and start esomeprazole as prescribed.  Stop sucralfate pills and start Carafate liquid as prescribed.    Recommend avoiding NSAID medications and Aspirin. If needed for pain, it is okay to take Tylenol (acetaminophen) available over-the-counter.  Do not exceed recommended daily dose.    Avoid foods that can trigger symptoms which may include citrus fruits, spicy foods, tomatoes and red sauces, chocolate, coffee/tea, caffeinated or carbonated  beverages, alcohol.    Schedule EGD to follow-up and evaluate for ulcer healing.    Follow-up with vascular surgery as recommended.

## 2024-10-18 RX ORDER — SUCRALFATE ORAL 1 G/10ML
1 SUSPENSION ORAL 3 TIMES DAILY
Qty: 1200 ML | Refills: 1 | Status: SHIPPED | OUTPATIENT
Start: 2024-10-18

## 2024-10-18 RX ORDER — ESOMEPRAZOLE MAGNESIUM 40 MG/1
40 CAPSULE, DELAYED RELEASE ORAL 2 TIMES DAILY
Qty: 60 CAPSULE | Refills: 2 | Status: SHIPPED | OUTPATIENT
Start: 2024-10-18 | End: 2024-10-21 | Stop reason: SDUPTHER

## 2024-10-21 ENCOUNTER — TELEPHONE (OUTPATIENT)
Dept: GASTROENTEROLOGY | Facility: CLINIC | Age: 29
End: 2024-10-21
Payer: MEDICAID

## 2024-10-21 RX ORDER — ESOMEPRAZOLE MAGNESIUM 40 MG/1
40 CAPSULE, DELAYED RELEASE ORAL 2 TIMES DAILY
Qty: 60 CAPSULE | Refills: 2 | Status: SHIPPED | OUTPATIENT
Start: 2024-10-21

## 2024-10-21 NOTE — TELEPHONE ENCOUNTER
"Pt's advocate left the following [employer] message on 10/18/2024 at 5:40pm...    \"We were trying to get his prescription refilled at [facility] and they said the prescription had to be altered and re-sent in because the insurance will not cover it the way it's written at two times a day. Please give me a call back at [phone] in regards to Anders Figueredo. Thank you. Have a blessed day.\"    LOV: 10/17/2024    NOV: 11/11/2024 (procedure)  "

## 2024-11-11 ENCOUNTER — ANESTHESIA EVENT (OUTPATIENT)
Dept: GASTROENTEROLOGY | Facility: HOSPITAL | Age: 29
End: 2024-11-11

## 2024-11-11 ENCOUNTER — HOSPITAL ENCOUNTER (OUTPATIENT)
Facility: HOSPITAL | Age: 29
Setting detail: HOSPITAL OUTPATIENT SURGERY
Discharge: HOME | End: 2024-11-11
Attending: INTERNAL MEDICINE | Admitting: INTERNAL MEDICINE

## 2024-11-11 ENCOUNTER — ANESTHESIA (OUTPATIENT)
Dept: GASTROENTEROLOGY | Facility: HOSPITAL | Age: 29
End: 2024-11-11

## 2024-11-11 VITALS
HEART RATE: 63 BPM | WEIGHT: 135 LBS | RESPIRATION RATE: 16 BRPM | BODY MASS INDEX: 18.9 KG/M2 | SYSTOLIC BLOOD PRESSURE: 100 MMHG | DIASTOLIC BLOOD PRESSURE: 54 MMHG | OXYGEN SATURATION: 100 % | HEIGHT: 71 IN

## 2024-11-11 DIAGNOSIS — K25.0 ACUTE GASTRIC ULCER WITH BLEEDING: ICD-10-CM

## 2024-11-11 DIAGNOSIS — K29.61 OTHER GASTRITIS WITH BLEEDING, UNSPECIFIED CHRONICITY: ICD-10-CM

## 2024-11-11 PROCEDURE — 88312 SPECIAL STAINS GROUP 1: CPT | Performed by: INTERNAL MEDICINE

## 2024-11-11 PROCEDURE — 88305 TISSUE EXAM BY PATHOLOGIST: CPT | Performed by: INTERNAL MEDICINE

## 2024-11-11 PROCEDURE — 25010000002 LIDOCAINE 2% SOLUTION: Performed by: NURSE ANESTHETIST, CERTIFIED REGISTERED

## 2024-11-11 PROCEDURE — 25010000002 PROPOFOL 1000 MG/100ML EMULSION: Performed by: NURSE ANESTHETIST, CERTIFIED REGISTERED

## 2024-11-11 PROCEDURE — 25810000003 LACTATED RINGERS PER 1000 ML: Performed by: NURSE PRACTITIONER

## 2024-11-11 PROCEDURE — 88342 IMHCHEM/IMCYTCHM 1ST ANTB: CPT | Performed by: INTERNAL MEDICINE

## 2024-11-11 PROCEDURE — 25010000002 PROPOFOL 200 MG/20ML EMULSION: Performed by: NURSE ANESTHETIST, CERTIFIED REGISTERED

## 2024-11-11 RX ORDER — PROMETHAZINE HYDROCHLORIDE 25 MG/1
25 SUPPOSITORY RECTAL ONCE AS NEEDED
Status: DISCONTINUED | OUTPATIENT
Start: 2024-11-11 | End: 2024-11-15 | Stop reason: HOSPADM

## 2024-11-11 RX ORDER — SODIUM CHLORIDE 0.9 % (FLUSH) 0.9 %
3 SYRINGE (ML) INJECTION EVERY 12 HOURS SCHEDULED
Status: DISCONTINUED | OUTPATIENT
Start: 2024-11-11 | End: 2024-11-15 | Stop reason: HOSPADM

## 2024-11-11 RX ORDER — SODIUM CHLORIDE, SODIUM LACTATE, POTASSIUM CHLORIDE, CALCIUM CHLORIDE 600; 310; 30; 20 MG/100ML; MG/100ML; MG/100ML; MG/100ML
30 INJECTION, SOLUTION INTRAVENOUS CONTINUOUS PRN
Status: DISCONTINUED | OUTPATIENT
Start: 2024-11-11 | End: 2024-11-11 | Stop reason: HOSPADM

## 2024-11-11 RX ORDER — EPHEDRINE SULFATE 50 MG/ML
5 INJECTION, SOLUTION INTRAVENOUS ONCE AS NEEDED
Status: DISCONTINUED | OUTPATIENT
Start: 2024-11-11 | End: 2024-11-15 | Stop reason: HOSPADM

## 2024-11-11 RX ORDER — PROMETHAZINE HYDROCHLORIDE 25 MG/1
25 TABLET ORAL ONCE AS NEEDED
Status: DISCONTINUED | OUTPATIENT
Start: 2024-11-11 | End: 2024-11-15 | Stop reason: HOSPADM

## 2024-11-11 RX ORDER — SODIUM CHLORIDE 0.9 % (FLUSH) 0.9 %
10 SYRINGE (ML) INJECTION AS NEEDED
Status: DISCONTINUED | OUTPATIENT
Start: 2024-11-11 | End: 2024-11-15 | Stop reason: HOSPADM

## 2024-11-11 RX ORDER — HYDROMORPHONE HYDROCHLORIDE 1 MG/ML
0.5 INJECTION, SOLUTION INTRAMUSCULAR; INTRAVENOUS; SUBCUTANEOUS
Status: DISCONTINUED | OUTPATIENT
Start: 2024-11-11 | End: 2024-11-15 | Stop reason: HOSPADM

## 2024-11-11 RX ORDER — DROPERIDOL 2.5 MG/ML
0.62 INJECTION, SOLUTION INTRAMUSCULAR; INTRAVENOUS
Status: DISCONTINUED | OUTPATIENT
Start: 2024-11-11 | End: 2024-11-15 | Stop reason: HOSPADM

## 2024-11-11 RX ORDER — ONDANSETRON 2 MG/ML
4 INJECTION INTRAMUSCULAR; INTRAVENOUS ONCE AS NEEDED
Status: DISCONTINUED | OUTPATIENT
Start: 2024-11-11 | End: 2024-11-15 | Stop reason: HOSPADM

## 2024-11-11 RX ORDER — HYDRALAZINE HYDROCHLORIDE 20 MG/ML
5 INJECTION INTRAMUSCULAR; INTRAVENOUS
Status: DISCONTINUED | OUTPATIENT
Start: 2024-11-11 | End: 2024-11-15 | Stop reason: HOSPADM

## 2024-11-11 RX ORDER — SODIUM CHLORIDE, SODIUM LACTATE, POTASSIUM CHLORIDE, CALCIUM CHLORIDE 600; 310; 30; 20 MG/100ML; MG/100ML; MG/100ML; MG/100ML
1000 INJECTION, SOLUTION INTRAVENOUS CONTINUOUS
Status: DISCONTINUED | OUTPATIENT
Start: 2024-11-11 | End: 2024-11-11 | Stop reason: HOSPADM

## 2024-11-11 RX ORDER — SODIUM CHLORIDE, SODIUM LACTATE, POTASSIUM CHLORIDE, CALCIUM CHLORIDE 600; 310; 30; 20 MG/100ML; MG/100ML; MG/100ML; MG/100ML
30 INJECTION, SOLUTION INTRAVENOUS CONTINUOUS PRN
Status: DISCONTINUED | OUTPATIENT
Start: 2024-11-11 | End: 2024-11-15 | Stop reason: HOSPADM

## 2024-11-11 RX ORDER — FENTANYL CITRATE 50 UG/ML
50 INJECTION, SOLUTION INTRAMUSCULAR; INTRAVENOUS
Status: DISCONTINUED | OUTPATIENT
Start: 2024-11-11 | End: 2024-11-15 | Stop reason: HOSPADM

## 2024-11-11 RX ORDER — DIPHENHYDRAMINE HYDROCHLORIDE 50 MG/ML
12.5 INJECTION INTRAMUSCULAR; INTRAVENOUS
Status: DISCONTINUED | OUTPATIENT
Start: 2024-11-11 | End: 2024-11-15 | Stop reason: HOSPADM

## 2024-11-11 RX ORDER — PROPOFOL 10 MG/ML
INJECTION, EMULSION INTRAVENOUS CONTINUOUS PRN
Status: DISCONTINUED | OUTPATIENT
Start: 2024-11-11 | End: 2024-11-11 | Stop reason: SURG

## 2024-11-11 RX ORDER — IPRATROPIUM BROMIDE AND ALBUTEROL SULFATE 2.5; .5 MG/3ML; MG/3ML
3 SOLUTION RESPIRATORY (INHALATION) ONCE AS NEEDED
Status: DISCONTINUED | OUTPATIENT
Start: 2024-11-11 | End: 2024-11-15 | Stop reason: HOSPADM

## 2024-11-11 RX ORDER — HYDROCODONE BITARTRATE AND ACETAMINOPHEN 5; 325 MG/1; MG/1
1 TABLET ORAL ONCE AS NEEDED
Status: DISCONTINUED | OUTPATIENT
Start: 2024-11-11 | End: 2024-11-15 | Stop reason: HOSPADM

## 2024-11-11 RX ORDER — LABETALOL HYDROCHLORIDE 5 MG/ML
5 INJECTION, SOLUTION INTRAVENOUS
Status: DISCONTINUED | OUTPATIENT
Start: 2024-11-11 | End: 2024-11-15 | Stop reason: HOSPADM

## 2024-11-11 RX ORDER — LIDOCAINE HYDROCHLORIDE 20 MG/ML
INJECTION, SOLUTION INFILTRATION; PERINEURAL AS NEEDED
Status: DISCONTINUED | OUTPATIENT
Start: 2024-11-11 | End: 2024-11-11 | Stop reason: SURG

## 2024-11-11 RX ORDER — OXYCODONE AND ACETAMINOPHEN 7.5; 325 MG/1; MG/1
1 TABLET ORAL EVERY 4 HOURS PRN
Status: DISCONTINUED | OUTPATIENT
Start: 2024-11-11 | End: 2024-11-15 | Stop reason: HOSPADM

## 2024-11-11 RX ORDER — NALOXONE HCL 0.4 MG/ML
0.2 VIAL (ML) INJECTION AS NEEDED
Status: DISCONTINUED | OUTPATIENT
Start: 2024-11-11 | End: 2024-11-15 | Stop reason: HOSPADM

## 2024-11-11 RX ORDER — PROPOFOL 10 MG/ML
INJECTION, EMULSION INTRAVENOUS AS NEEDED
Status: DISCONTINUED | OUTPATIENT
Start: 2024-11-11 | End: 2024-11-11 | Stop reason: SURG

## 2024-11-11 RX ORDER — FLUMAZENIL 0.1 MG/ML
0.2 INJECTION INTRAVENOUS AS NEEDED
Status: DISCONTINUED | OUTPATIENT
Start: 2024-11-11 | End: 2024-11-15 | Stop reason: HOSPADM

## 2024-11-11 RX ADMIN — PROPOFOL INJECTABLE EMULSION 20 MG: 10 INJECTION, EMULSION INTRAVENOUS at 12:24

## 2024-11-11 RX ADMIN — PROPOFOL INJECTABLE EMULSION 80 MG: 10 INJECTION, EMULSION INTRAVENOUS at 12:21

## 2024-11-11 RX ADMIN — PROPOFOL INJECTABLE EMULSION 30 MG: 10 INJECTION, EMULSION INTRAVENOUS at 12:27

## 2024-11-11 RX ADMIN — PROPOFOL 180 MCG/KG/MIN: 10 INJECTION, EMULSION INTRAVENOUS at 12:20

## 2024-11-11 RX ADMIN — PROPOFOL 200 MCG/KG/MIN: 10 INJECTION, EMULSION INTRAVENOUS at 12:24

## 2024-11-11 RX ADMIN — SODIUM CHLORIDE, POTASSIUM CHLORIDE, SODIUM LACTATE AND CALCIUM CHLORIDE 30 ML/HR: 600; 310; 30; 20 INJECTION, SOLUTION INTRAVENOUS at 11:28

## 2024-11-11 RX ADMIN — LIDOCAINE HYDROCHLORIDE 60 MG: 20 INJECTION, SOLUTION INFILTRATION; PERINEURAL at 12:20

## 2024-11-11 NOTE — H&P
No chief complaint on file.      HPI  Follow up gastric ulcer         Problem List:    Patient Active Problem List   Diagnosis    Abnormal computed tomography angiography (CTA) of abdomen and pelvis    Leukocytosis    Chest pain    Nausea & vomiting    Dyspnea    Abnormal EKG    Epigastric pain    Acute gastric ulcer with hemorrhage    Iron deficiency anemia due to chronic blood loss    Acute gastric ulcer with bleeding    Other gastritis with bleeding       Medical History:    Past Medical History:   Diagnosis Date    Unable to eat         Social History:    Social History     Socioeconomic History    Marital status: Single   Tobacco Use    Smoking status: Never    Smokeless tobacco: Never   Vaping Use    Vaping status: Never Used   Substance and Sexual Activity    Drug use: Yes     Types: Marijuana     Comment: Pt reports smoking cannibis    Sexual activity: Defer       Family History:   Family History   Problem Relation Age of Onset    Colon cancer Neg Hx     Colon polyps Neg Hx     Crohn's disease Neg Hx     Irritable bowel syndrome Neg Hx     Ulcerative colitis Neg Hx        Surgical History:   Past Surgical History:   Procedure Laterality Date    ENDOSCOPY N/A 9/5/2024    Procedure: ESOPHAGOGASTRODUODENOSCOPY with bx and endoclot;  Surgeon: Tom Estes MD;  Location: [facility] ENDOSCOPY;  Service: Gastroenterology;  Laterality: N/A;  pre- abd pain  post- ulcer    WISDOM TOOTH EXTRACTION  2023       No current facility-administered medications for this encounter.    Current Outpatient Medications:     esomeprazole (nexIUM) 40 MG capsule, Take 1 capsule by mouth 2 (Two) Times a Day., Disp: 60 capsule, Rfl: 2    ondansetron ODT (ZOFRAN-ODT) 4 MG disintegrating tablet, Place 1 tablet on the tongue Every 8 (Eight) Hours As Needed for Nausea or Vomiting for up to 10 doses., Disp: 10 tablet, Rfl: 0    sucralfate (Carafate) 1 GM/10ML suspension, Take 10 mL by mouth 3 (Three) Times a Day., Disp: 1200 mL, Rfl:  1    Allergies: No Known Allergies     The following portions of the patient's history were reviewed by me and updated as appropriate: review of systems, allergies, current medications, past family history, past medical history, past social history, past surgical history and problem list.    There were no vitals filed for this visit.    PHYSICAL EXAM:    CONSTITUTIONAL:  today's vital signs reviewed by me  GASTROINTESTINAL: abdomen is soft nontender nondistended with normal active bowel sounds, no masses are appreciated    Assessment/ Plan  Fu gastric ulcer    egd    Risks and benefits as well as alternatives to endoscopic evaluation were explained to the patient and they voiced understanding and wish to proceed.  These risks include but are not limited to the risk of bleeding, perforation, adverse reaction to sedation, and missed lesions.  The patient was given the opportunity to ask questions prior to the endoscopic procedure.

## 2024-11-11 NOTE — ANESTHESIA POSTPROCEDURE EVALUATION
Patient: Anders Figueredo    Procedure Summary       Date: 11/11/24 Room / [facility] /  [facility]    Anesthesia Start: 1217 Anesthesia Stop: 1236    Procedure: ESOPHAGOGASTRODUODENOSCOPY with biopsy Diagnosis:       Acute gastric ulcer with bleeding      Other gastritis with bleeding, unspecified chronicity      (Acute gastric ulcer with bleeding [K25.0])      (Other gastritis with bleeding, unspecified chronicity [K29.61])    Surgeons: Tom sEtes MD Provider: Jose Martin Torres MD    Anesthesia Type: MAC ASA Status: 2            Anesthesia Type: MAC    Vitals  Vitals Value Taken Time   /54 11/11/24 1254   Temp     Pulse 76 11/11/24 1256   Resp 16 11/11/24 1254   SpO2 99 % 11/11/24 1256   Vitals shown include unfiled device data.        Post Anesthesia Care and Evaluation    Patient location during evaluation: PACU  Patient participation: complete - patient participated  Level of consciousness: awake and alert  Pain management: adequate    Airway patency: patent  Anesthetic complications: No anesthetic complications    Cardiovascular status: acceptable  Respiratory status: acceptable  Hydration status: acceptable    Comments: --------------------            11/11/24               1254     --------------------   BP:       100/54     Pulse:      63       Resp:       16       SpO2:      100%     --------------------

## 2024-11-11 NOTE — ANESTHESIA PREPROCEDURE EVALUATION
Anesthesia Evaluation     Patient summary reviewed and Nursing notes reviewed                Airway   Mallampati: II  Dental      Pulmonary    (+) ,shortness of breath  Cardiovascular     ECG reviewed  Rhythm: regular  Rate: normal    (+) dysrhythmias (RBB and LPFB)      Neuro/Psych- negative ROS  GI/Hepatic/Renal/Endo    (+) PUD, GI bleeding     Musculoskeletal (-) negative ROS    Abdominal    Substance History   (+) drug use (Marijuana)     OB/GYN negative ob/gyn ROS         Other                    Anesthesia Plan    ASA 2     MAC     intravenous induction     Anesthetic plan, risks, benefits, and alternatives have been provided, discussed and informed consent has been obtained with: patient.    CODE STATUS:

## 2024-11-15 LAB
CYTO UR: NORMAL
LAB AP CASE REPORT: NORMAL
LAB AP DIAGNOSIS COMMENT: NORMAL
LAB AP SPECIAL STAINS: NORMAL
PATH REPORT.FINAL DX SPEC: NORMAL
PATH REPORT.GROSS SPEC: NORMAL

## 2024-12-08 ENCOUNTER — HOSPITAL ENCOUNTER (EMERGENCY)
Facility: HOSPITAL | Age: 29
Discharge: HOME | End: 2024-12-08
Attending: EMERGENCY MEDICINE | Admitting: EMERGENCY MEDICINE
Payer: MEDICAID

## 2024-12-08 ENCOUNTER — APPOINTMENT (OUTPATIENT)
Dept: GENERAL RADIOLOGY | Facility: HOSPITAL | Age: 29
End: 2024-12-08
Payer: MEDICAID

## 2024-12-08 VITALS
SYSTOLIC BLOOD PRESSURE: 124 MMHG | WEIGHT: 138 LBS | HEART RATE: 75 BPM | BODY MASS INDEX: 19.32 KG/M2 | HEIGHT: 71 IN | TEMPERATURE: 98.7 F | DIASTOLIC BLOOD PRESSURE: 99 MMHG | OXYGEN SATURATION: 95 % | RESPIRATION RATE: 18 BRPM

## 2024-12-08 DIAGNOSIS — M79.641 BILATERAL HAND PAIN: Primary | ICD-10-CM

## 2024-12-08 DIAGNOSIS — M79.642 BILATERAL HAND PAIN: Primary | ICD-10-CM

## 2024-12-08 LAB
ALBUMIN SERPL-MCNC: 3.8 G/DL (ref 3.5–5.2)
ALBUMIN/GLOB SERPL: 1 G/DL
ALP SERPL-CCNC: 92 U/L (ref 39–117)
ALT SERPL W P-5'-P-CCNC: 10 U/L (ref 1–41)
ANION GAP SERPL CALCULATED.3IONS-SCNC: 10.8 MMOL/L (ref 5–15)
AST SERPL-CCNC: 31 U/L (ref 1–40)
BASOPHILS # BLD AUTO: 0.02 10*3/MM3 (ref 0–0.2)
BASOPHILS NFR BLD AUTO: 0.2 % (ref 0–1.5)
BILIRUB SERPL-MCNC: 0.3 MG/DL (ref 0–1.2)
BUN SERPL-MCNC: 14 MG/DL (ref 6–20)
BUN/CREAT SERPL: 13.7 (ref 7–25)
CALCIUM SPEC-SCNC: 9 MG/DL (ref 8.6–10.5)
CHLORIDE SERPL-SCNC: 100 MMOL/L (ref 98–107)
CO2 SERPL-SCNC: 26.2 MMOL/L (ref 22–29)
CREAT SERPL-MCNC: 1.02 MG/DL (ref 0.76–1.27)
DEPRECATED RDW RBC AUTO: 41.4 FL (ref 37–54)
EGFRCR SERPLBLD CKD-EPI 2021: 103.3 ML/MIN/1.73
EOSINOPHIL # BLD AUTO: 0.05 10*3/MM3 (ref 0–0.4)
EOSINOPHIL NFR BLD AUTO: 0.6 % (ref 0.3–6.2)
ERYTHROCYTE [DISTWIDTH] IN BLOOD BY AUTOMATED COUNT: 14.5 % (ref 12.3–15.4)
GLOBULIN UR ELPH-MCNC: 3.9 GM/DL
GLUCOSE SERPL-MCNC: 99 MG/DL (ref 65–99)
HCT VFR BLD AUTO: 35.4 % (ref 37.5–51)
HGB BLD-MCNC: 11.6 G/DL (ref 13–17.7)
IMM GRANULOCYTES # BLD AUTO: 0.02 10*3/MM3 (ref 0–0.05)
IMM GRANULOCYTES NFR BLD AUTO: 0.2 % (ref 0–0.5)
LYMPHOCYTES # BLD AUTO: 2.58 10*3/MM3 (ref 0.7–3.1)
LYMPHOCYTES NFR BLD AUTO: 29.2 % (ref 19.6–45.3)
MAGNESIUM SERPL-MCNC: 1.9 MG/DL (ref 1.6–2.6)
MCH RBC QN AUTO: 25.8 PG (ref 26.6–33)
MCHC RBC AUTO-ENTMCNC: 32.8 G/DL (ref 31.5–35.7)
MCV RBC AUTO: 78.8 FL (ref 79–97)
MONOCYTES # BLD AUTO: 1.01 10*3/MM3 (ref 0.1–0.9)
MONOCYTES NFR BLD AUTO: 11.4 % (ref 5–12)
NEUTROPHILS NFR BLD AUTO: 5.15 10*3/MM3 (ref 1.7–7)
NEUTROPHILS NFR BLD AUTO: 58.4 % (ref 42.7–76)
NRBC BLD AUTO-RTO: 0 /100 WBC (ref 0–0.2)
PLATELET # BLD AUTO: 294 10*3/MM3 (ref 140–450)
PMV BLD AUTO: 10.6 FL (ref 6–12)
POTASSIUM SERPL-SCNC: 3.8 MMOL/L (ref 3.5–5.2)
PROT SERPL-MCNC: 7.7 G/DL (ref 6–8.5)
RBC # BLD AUTO: 4.49 10*6/MM3 (ref 4.14–5.8)
SODIUM SERPL-SCNC: 137 MMOL/L (ref 136–145)
WBC NRBC COR # BLD AUTO: 8.83 10*3/MM3 (ref 3.4–10.8)

## 2024-12-08 PROCEDURE — 73120 X-RAY EXAM OF HAND: CPT

## 2024-12-08 PROCEDURE — 36415 COLL VENOUS BLD VENIPUNCTURE: CPT

## 2024-12-08 PROCEDURE — 85025 COMPLETE CBC W/AUTO DIFF WBC: CPT | Performed by: EMERGENCY MEDICINE

## 2024-12-08 PROCEDURE — 80053 COMPREHEN METABOLIC PANEL: CPT | Performed by: EMERGENCY MEDICINE

## 2024-12-08 PROCEDURE — 99283 EMERGENCY DEPT VISIT LOW MDM: CPT

## 2024-12-08 PROCEDURE — 83735 ASSAY OF MAGNESIUM: CPT | Performed by: EMERGENCY MEDICINE

## 2024-12-08 RX ORDER — DICLOFENAC SODIUM 30 MG/G
2 GEL TOPICAL 4 TIMES DAILY
Status: DISCONTINUED | OUTPATIENT
Start: 2024-12-08 | End: 2024-12-08 | Stop reason: HOSPADM

## 2024-12-08 RX ORDER — ACETAMINOPHEN 500 MG
1000 TABLET ORAL ONCE
Status: COMPLETED | OUTPATIENT
Start: 2024-12-08 | End: 2024-12-08

## 2024-12-08 RX ADMIN — ACETAMINOPHEN 1000 MG: 500 TABLET ORAL at 12:30

## 2024-12-08 RX ADMIN — DICLOFENAC SODIUM 2 G: 30 GEL TOPICAL at 11:30

## 2024-12-08 NOTE — ED PROVIDER NOTES
Pt presents to the ED c/o bilateral hand pain.  Patient just returned from [place] where he drove straight through through the night after picking up his daughter.  States that his hands were gripping tightly the steering wheel the whole time.  Denies any recent fall or injury.  No swelling or redness.  Having pain in the hands today.     On exam,   General: No acute distress, nontoxic  HEENT: EOMI  Pulm: Symmetric chest rise, nonlabored breathing  CV: Regular rate and rhythm  GI: Nondistended  MSK: No deformity, no significant erythema or edema to the hands, no outward trauma  Skin: Warm, dry  Neuro: Awake, alert, oriented x 4, moving all extremities, no focal deficits  Psych: Calm, cooperative    Vital signs and nursing notes reviewed.           Plan: Plan for basic labs to check for any signs of glucose abnormality, electrolyte normalities.  X-ray of the hands to eval for any signs of trauma or underlying inflammatory change.  Reevaluate with results.  Supportive care.    ED Course as of 12/08/24 1215   Sun Dec 08, 2024   1138 WBC: 8.83 [DC]   1138 Glucose: 99 [DC]   1139 BUN: 14 [DC]   1139 Creatinine: 1.02 [DC]   1139 Sodium: 137 [DC]   1139 Potassium: 3.8 [DC]   1139 Magnesium: 1.9 [DC]      ED Course User Index  [DC] Barrera Barrett MD       Suspect more of a stress induced pain from gripping of the steering wheel for prolonged period of time.  Suggest anti-inflammatory gel and Tylenol and I would expect improvement over the next couple days.  Outpatient follow-up as needed, ED return for worsening symptoms as needed.     Diagnosis Plan   1. Bilateral hand pain          DISCHARGE    Patient discharged in stable condition.    Reviewed implications of results, diagnosis, meds, responsibility to follow up, warning signs and symptoms of possible worsening, potential complications and reasons to return to ER. If your blood pressure > 120/80 please follow up with your primary care provider for further  management.    Patient/Family voiced understanding of above instructions.    Discussed plan for discharge, as there is no emergent indication for admission. Pt/family is agreeable and understands need for follow up and repeat testing.  Pt is aware that discharge does not mean that nothing is wrong but it indicates no emergency is present that requires admission and they must continue care with follow-up as given below or physician of their choice.     FOLLOW-UP  [facility] EMERGENCY DEPARTMENT  [address] [facility] [address] [phone]    As needed, If symptoms worsen         Medication List      No changes were made to your prescriptions during this visit.              MD Attestation Note    SHARED VISIT: This visit was performed by BOTH a physician and an APC. The substantive portion of the medical decision making was performed by this attesting physician who made or approved the management plan and takes responsibility for patient management. All studies in the APC note (if performed) were independently interpreted by me.                   Barrera Barrett MD  12/08/24 9615

## 2024-12-08 NOTE — DISCHARGE INSTRUCTIONS
Use the topical antiinflammatory medication as discussed, Tylenol for additional pain support, expect this to be better over the next few days.  ED return for worsening symptoms as needed.

## 2024-12-08 NOTE — ED NOTES
Pt arrives for bilateral hand pain that started last night. Pt denies any injury other than long periods of driving

## 2024-12-08 NOTE — ED PROVIDER NOTES
EMERGENCY DEPARTMENT ENCOUNTER      Room Number:  17/17  PCP: Provider, No Known  Independent Historians: Patient  Patient Care Team:  Provider, No Known as PCP - Delaney Leung APRN as Nurse Practitioner (Nurse Practitioner)       HPI:  Chief Complaint: Hand pain    A complete HPI/ROS/PMH/PSH/SH/FH are unobtainable due to: None    Chronic or social conditions impacting patient care (Social Determinants of Health): None      Context: Anders Figueredo is a 28 y.o. male with a PMH significant for gastric ulcer who presents to the ED c/o acute aching pain to the hands bilaterally.  Symptoms began around 3 AM as he was driving from [place].  He reports his symptoms have persisted through the morning and he has decreased range of motion secondary to pain.  No associated swelling or discoloration.  His symptoms are primarily located from the MCP joints of his fingers to the wrist bilaterally.  No numbness or tingling distally.  No history of similar symptoms.  No injuries.  He does report that he believes gripping the steering well while driving has exacerbated his symptoms.      Upon review of prior external notes (non-ED) -and- Review of prior external test results outside of this encounter it appears the patient was evaluated in the office with 10/17/2024.  The patient had a normal magnesium and phosphorus on 9/5/2024.      PAST MEDICAL HISTORY  Active Ambulatory Problems     Diagnosis Date Noted    Abnormal computed tomography angiography (CTA) of abdomen and pelvis 09/03/2024    Leukocytosis 09/03/2024    Chest pain 09/03/2024    Nausea & vomiting 09/03/2024    Dyspnea 09/03/2024    Abnormal EKG 09/03/2024    Epigastric pain 09/02/2024    Acute gastric ulcer with hemorrhage 09/05/2024    Iron deficiency anemia due to chronic blood loss 09/05/2024    Acute gastric ulcer with bleeding 09/18/2024    Other gastritis with bleeding 09/18/2024     Resolved Ambulatory Problems     Diagnosis Date Noted    No  Resolved Ambulatory Problems     Past Medical History:   Diagnosis Date    Peptic ulcer 09/2024    Unable to eat 2024         PAST SURGICAL HISTORY  Past Surgical History:   Procedure Laterality Date    ENDOSCOPY N/A 9/5/2024    Procedure: ESOPHAGOGASTRODUODENOSCOPY with bx and endoclot;  Surgeon: Tom Estes MD;  Location: [facility] ENDOSCOPY;  Service: Gastroenterology;  Laterality: N/A;  pre- abd pain  post- ulcer    ENDOSCOPY N/A 11/11/2024    Procedure: ESOPHAGOGASTRODUODENOSCOPY with biopsy;  Surgeon: Tom Estes MD;  Location: [facility] ENDOSCOPY;  Service: Gastroenterology;  Laterality: N/A;  pre: follow up previous ulcer    WISDOM TOOTH EXTRACTION  2023         FAMILY HISTORY  Family History   Problem Relation Age of Onset    Colon cancer Neg Hx     Colon polyps Neg Hx     Crohn's disease Neg Hx     Irritable bowel syndrome Neg Hx     Ulcerative colitis Neg Hx          SOCIAL HISTORY  Social History     Socioeconomic History    Marital status: Single   Tobacco Use    Smoking status: Never    Smokeless tobacco: Never   Vaping Use    Vaping status: Never Used   Substance and Sexual Activity    Drug use: Yes     Types: Marijuana     Comment: Pt reports smoking cannibis    Sexual activity: Defer         ALLERGIES  Patient has no known allergies.      REVIEW OF SYSTEMS  Included in HPI  All systems reviewed and negative except for those discussed in HPI.      PHYSICAL EXAM    I have reviewed the triage vital signs and nursing notes.    ED Triage Vitals [12/08/24 1000]   Temp Heart Rate Resp BP SpO2   98.7 °F (37.1 °C) 87 18 -- 97 %      Temp src Heart Rate Source Patient Position BP Location FiO2 (%)   -- -- -- -- --       Physical Exam  Constitutional:       General: He is not in acute distress.     Appearance: He is well-developed.   HENT:      Head: Normocephalic and atraumatic.   Eyes:      General: No scleral icterus.     Conjunctiva/sclera: Conjunctivae normal.   Neck:      Trachea: No tracheal  deviation.   Cardiovascular:      Rate and Rhythm: Normal rate and regular rhythm.   Pulmonary:      Effort: Pulmonary effort is normal.      Breath sounds: Normal breath sounds.   Abdominal:      Palpations: Abdomen is soft.      Tenderness: There is no abdominal tenderness. There is no guarding.   Musculoskeletal:         General: No deformity.      Cervical back: Normal range of motion.      Comments: No gross deformity, swelling, discoloration to the hands bilaterally.  Some decreased range of motion of the finger secondary to pain.  No changes to sensation.  Capillary refill normal bilaterally.   Lymphadenopathy:      Cervical: No cervical adenopathy.   Skin:     General: Skin is warm and dry.   Neurological:      Mental Status: He is alert and oriented to person, place, and time.   Psychiatric:         Behavior: Behavior normal.         Vital signs and nursing notes reviewed.      PPE: I wore a surgical mask throughout my encounters with the pt. I performed hand hygiene on entry into the pt room and upon exit.     LAB RESULTS  Recent Results (from the past 24 hours)   Comprehensive Metabolic Panel    Collection Time: 12/08/24 10:19 AM    Specimen: Blood   Result Value Ref Range    Glucose 99 65 - 99 mg/dL    BUN 14 6 - 20 mg/dL    Creatinine 1.02 0.76 - 1.27 mg/dL    Sodium 137 136 - 145 mmol/L    Potassium 3.8 3.5 - 5.2 mmol/L    Chloride 100 98 - 107 mmol/L    CO2 26.2 22.0 - 29.0 mmol/L    Calcium 9.0 8.6 - 10.5 mg/dL    Total Protein 7.7 6.0 - 8.5 g/dL    Albumin 3.8 3.5 - 5.2 g/dL    ALT (SGPT) 10 1 - 41 U/L    AST (SGOT) 31 1 - 40 U/L    Alkaline Phosphatase 92 39 - 117 U/L    Total Bilirubin 0.3 0.0 - 1.2 mg/dL    Globulin 3.9 gm/dL    A/G Ratio 1.0 g/dL    BUN/Creatinine Ratio 13.7 7.0 - 25.0    Anion Gap 10.8 5.0 - 15.0 mmol/L    eGFR 103.3 >60.0 mL/min/1.73   Magnesium    Collection Time: 12/08/24 10:19 AM    Specimen: Blood   Result Value Ref Range    Magnesium 1.9 1.6 - 2.6 mg/dL   CBC Auto  Differential    Collection Time: 12/08/24 10:19 AM    Specimen: Blood   Result Value Ref Range    WBC 8.83 3.40 - 10.80 10*3/mm3    RBC 4.49 4.14 - 5.80 10*6/mm3    Hemoglobin 11.6 (L) 13.0 - 17.7 g/dL    Hematocrit 35.4 (L) 37.5 - 51.0 %    MCV 78.8 (L) 79.0 - 97.0 fL    MCH 25.8 (L) 26.6 - 33.0 pg    MCHC 32.8 31.5 - 35.7 g/dL    RDW 14.5 12.3 - 15.4 %    RDW-SD 41.4 37.0 - 54.0 fl    MPV 10.6 6.0 - 12.0 fL    Platelets 294 140 - 450 10*3/mm3    Neutrophil % 58.4 42.7 - 76.0 %    Lymphocyte % 29.2 19.6 - 45.3 %    Monocyte % 11.4 5.0 - 12.0 %    Eosinophil % 0.6 0.3 - 6.2 %    Basophil % 0.2 0.0 - 1.5 %    Immature Grans % 0.2 0.0 - 0.5 %    Neutrophils, Absolute 5.15 1.70 - 7.00 10*3/mm3    Lymphocytes, Absolute 2.58 0.70 - 3.10 10*3/mm3    Monocytes, Absolute 1.01 (H) 0.10 - 0.90 10*3/mm3    Eosinophils, Absolute 0.05 0.00 - 0.40 10*3/mm3    Basophils, Absolute 0.02 0.00 - 0.20 10*3/mm3    Immature Grans, Absolute 0.02 0.00 - 0.05 10*3/mm3    nRBC 0.0 0.0 - 0.2 /100 WBC         RADIOLOGY  XR Hand 2 View Bilateral    Result Date: 12/8/2024  XR HAND 2 VW BILATERAL-   INDICATION: Bilateral aching hand pain  COMPARISON: None  TECHNIQUE: 2 view bilateral hand  FINDINGS:  Right hand: No fracture. No bone lesion. No dislocation. Preserved joint spaces.  Left hand: No acute appearing fracture. 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 No dislocation. Preserved joint spaces.       1. No acute appearing fracture. No dislocation. 2. Preserved joint spaces. 3. Old ununited left ulnar styloid process fracture.  This report was finalized on 12/8/2024 11:24 AM by Dr. Mehran Askew M.D on Workstation: [id number]         MEDICATIONS GIVEN IN ER  Medications   acetaminophen (TYLENOL) tablet 1,000 mg (1,000 mg Oral Given 12/8/24 1230)         ORDERS PLACED DURING THIS VISIT:  Orders Placed This Encounter   Procedures    XR Hand 2 View Bilateral    Comprehensive Metabolic Panel    Magnesium    CBC Auto Differential    CBC &  Differential         OUTPATIENT MEDICATION MANAGEMENT:  No current Epic-ordered facility-administered medications on file.     Current Outpatient Medications Ordered in Epic   Medication Sig Dispense Refill    esomeprazole (nexIUM) 40 MG capsule Take 1 capsule by mouth 2 (Two) Times a Day. 60 capsule 2    ondansetron ODT (ZOFRAN-ODT) 4 MG disintegrating tablet Place 1 tablet on the tongue Every 8 (Eight) Hours As Needed for Nausea or Vomiting for up to 10 doses. 10 tablet 0    sucralfate (Carafate) 1 GM/10ML suspension Take 10 mL by mouth 3 (Three) Times a Day. 1200 mL 1              PROGRESS, DATA ANALYSIS, CONSULTS, AND MEDICAL DECISION MAKING  All labs have been independently interpreted by me.  All radiology studies have been reviewed by me. All EKG's have been independently viewed and interpreted by me.  Discussion below represents my analysis of pertinent findings related to patient's condition, differential diagnosis, treatment plan and final disposition.      DIFFERENTIAL DIAGNOSIS INCLUDE BUT NOT LIMITED TO:     Arthritis, overuse injury, hand fracture    Clinical Scores: N/A      ED Course as of 12/09/24 0549   [place] Dec 08, 2024   1138 WBC: 8.83 [DC]   1138 Glucose: 99 [DC]   1139 BUN: 14 [DC]   1139 Creatinine: 1.02 [DC]   1139 Sodium: 137 [DC]   1139 Potassium: 3.8 [DC]   1139 Magnesium: 1.9 [DC]      ED Course User Index  [DC] Barrera Barrett MD              AS OF 05:49 EST VITALS:    BP - 124/99  HR - 75  TEMP - 98.7 °F (37.1 °C)  O2 SATS - 95%    COMPLEXITY OF CARE  Admission was considered but after careful review of the patient's presentation, physical examination, diagnostic results, and response to treatment the patient may be safely discharged with outpatient follow-up.      DIAGNOSIS  Final diagnoses:   Bilateral hand pain         DISPOSITION  ED Disposition       ED Disposition   Discharge    Condition   Stable    Comment   --                Please note that portions of this document were  completed with a voice recognition program.    Note Disclaimer: At [facility], we believe that sharing information builds trust and better relationships. You are receiving this note because you recently visited [facility]. It is possible you will see health information before a provider has talked with you about it. This kind of information can be easy to misunderstand. To help you fully understand what it means for your health, we urge you to discuss this note with your provider.         Lazaro Camargo PA  12/09/24 0549

## (undated) DEVICE — BLCK/BITE BLOX W/DENTL/RIM W/STRAP 54F

## (undated) DEVICE — LN SMPL CO2 SHTRM SD STREAM W/M LUER

## (undated) DEVICE — KT ORCA ORCAPOD DISP STRL

## (undated) DEVICE — CANN O2 ETCO2 FITS ALL CONN CO2 SMPL A/ 7IN DISP LF

## (undated) DEVICE — SENSR O2 OXIMAX FNGR A/ 18IN NONSTR

## (undated) DEVICE — HEMO PWDR ENDOCLOT/PHS AMP 3G

## (undated) DEVICE — ADAPT CLN BIOGUARD AIR/H2O DISP

## (undated) DEVICE — FRCP BX RADJAW4 NDL 2.8 240CM LG OG BX40

## (undated) DEVICE — TUBING, SUCTION, 1/4" X 10', STRAIGHT: Brand: MEDLINE